# Patient Record
Sex: FEMALE | Race: BLACK OR AFRICAN AMERICAN | NOT HISPANIC OR LATINO | Employment: FULL TIME | URBAN - METROPOLITAN AREA
[De-identification: names, ages, dates, MRNs, and addresses within clinical notes are randomized per-mention and may not be internally consistent; named-entity substitution may affect disease eponyms.]

---

## 2022-11-28 ENCOUNTER — HOSPITAL ENCOUNTER (OUTPATIENT)
Dept: DATA CONVERSION | Facility: HOSPITAL | Age: 27
End: 2022-11-28
Attending: OBSTETRICS & GYNECOLOGY

## 2022-11-28 DIAGNOSIS — Z3A.36 36 WEEKS GESTATION OF PREGNANCY (HHS-HCC): ICD-10-CM

## 2022-11-28 DIAGNOSIS — J45.909 UNSPECIFIED ASTHMA, UNCOMPLICATED (HHS-HCC): ICD-10-CM

## 2022-11-28 DIAGNOSIS — O99.513 DISEASES OF THE RESPIRATORY SYSTEM COMPLICATING PREGNANCY, THIRD TRIMESTER (HHS-HCC): ICD-10-CM

## 2022-11-28 DIAGNOSIS — E28.2 POLYCYSTIC OVARIAN SYNDROME: ICD-10-CM

## 2022-11-28 DIAGNOSIS — O99.283 ENDOCRINE, NUTRITIONAL AND METABOLIC DISEASES COMPLICATING PREGNANCY, THIRD TRIMESTER (HHS-HCC): ICD-10-CM

## 2022-11-28 DIAGNOSIS — O47.03 FALSE LABOR BEFORE 37 COMPLETED WEEKS OF GESTATION, THIRD TRIMESTER (HHS-HCC): ICD-10-CM

## 2022-11-28 DIAGNOSIS — Z34.80 ENCOUNTER FOR SUPERVISION OF OTHER NORMAL PREGNANCY, UNSPECIFIED TRIMESTER (HHS-HCC): ICD-10-CM

## 2022-11-28 DIAGNOSIS — N89.8 OTHER SPECIFIED NONINFLAMMATORY DISORDERS OF VAGINA: ICD-10-CM

## 2022-11-28 DIAGNOSIS — D57.3 SICKLE-CELL TRAIT (CMS-HCC): ICD-10-CM

## 2022-11-28 DIAGNOSIS — D64.9 ANEMIA, UNSPECIFIED: ICD-10-CM

## 2022-11-28 DIAGNOSIS — O99.891 OTHER SPECIFIED DISEASES AND CONDITIONS COMPLICATING PREGNANCY (HHS-HCC): ICD-10-CM

## 2022-11-28 DIAGNOSIS — O99.013 ANEMIA COMPLICATING PREGNANCY, THIRD TRIMESTER (HHS-HCC): ICD-10-CM

## 2022-11-28 LAB — POCT GLUCOSE: 99 MG/DL (ref 74–99)

## 2022-11-29 LAB
CLUE CELLS: ABNORMAL
NUGENT SCORE: 0
YEAST: PRESENT

## 2023-03-01 NOTE — PROGRESS NOTES
Current Stage:   Stage: Triage     OB Dating:   EDC/EGA:  ·  Final BRISSA 23-Dec-2022   ·  EGA 36.3     Subjective Data:   Antepartum:  Vaginal Bleeding: No   Contractions/Abdominal Pain: No   Discharge/Loss of Fluid: Yes   Fetal Movement: Good   Fevers/Chills: No   Preeclampsia Symptoms: No   Antepartum:    Rocio is a 26yo  at 36.3 b/o 13.4wk U/S who presents to triage for LOF.  She states her paints were wet at 2pm today.  She denies continued leaking.  She states the fluid was  white.  She denies VB, ctx, and endorses good fetal movement.    Pregnancy notables:  - Abnormal 1hr, declining 3hr, checking BS at home  - Anemia--> Hgb 10.1 , PO iron --> 9.6 in triage on   - Asthma, remote hx in childhood, no inhaler use, no h/o hospitalization  - Sickle cell trait- urine culture each trimester    ObHx:  : 39.3 wga,  (7#3oz)  2015: 39.3 wga,  (7#6oz)  GynHx: denies   PMH: PCOS  PSH: : breast reduction  FamHx: breast,  colon, liver  CA- paternal grandparents  Meds: PNV, Iron daily  All: NKDA  Social: denies t/e/d      Objective Information:    Objective Information:      T   P  R  BP   MAP  SpO2   Value  36.8  107  16  121/63   85  97%  Date/Time  15:53  16:37  15:53  15:53   15:53  16:37  Range  (36.8C - 37C )  (93 - 120 )  (16 - 16 )  (115 - 121 )/ (63 - 70 )  (85 - 85 )  (97% - 100% )  Highest temp of 37 C was recorded at  15:21      Pain reported at  15:53: pt only reports labor-like pain and denies other pain at this time      Physical Exam:   Constitutional: Alert, conversational, well-appearing   Obstetric: Cervical Exam: /-3    FHT: 145, mod variability, +accels, -decels   [place]: irritability    SSE:  - Neg for nitrazine, ferning, and pooling  - Thick discharge noted   Eyes: Sclera white, EOM intact, no discharge or erythema   ENMT: No hearing deficit, no goiter present   Head/Neck: Normocephalic, atraumatic, full range  of motion intact    Respiratory/Thorax: No increased work of breathing,  no cough present, rate normal, clear to auscultation   Cardiovascular: No edema present, RRR   Gastrointestinal: Gravid   Genitourinary: No suprapubic tenderness   Musculoskeletal: Strength +5 in all extremities bilaterally   Extremities: No calf tenderness, redness or warmth   Neurological: A/O x3, conversational   Breast: No redness or warmth   Psychological: Behavior appropriate, interactive   Skin: No rashes or lesions      Testing:   NST Interpretation - Baby A:  ·  Baseline    ·  Variability moderate (amplitude range 6 to 25 bpm)   ·  Interpretation Reactive (2 15x15 accels)   ·  Accelerations Present   ·  Decelerations Absent     Biophysical Profile - Baby A:  ·  Fetal Reactive Reactive non stress test     Assessment and Plan:        Additional Dx:   False labor: Entered Date: 2022 16:49   Non-stress test reactive: Entered Date: 2022 16:48   36 weeks gestation of pregnancy: Entered Date: 2022  16:48    Assessment:    Rocio is a 28yo  at 36.3 b/o 13.4wk U/S who presents to triage for LOF    False labor   - Cervix: /-3  - TOCO: quiet  - SSE: neg x3  - S/sx of labor reviewed  - Recommended tylenol, warm showers, hot packs for discomfort    IUP at 36.3wks  - NST reactive  - Good fetal movement  - Continue routine prenatal care  - Next appt   - Precautions to return discussed     Maternal Well-being  - Vital signs stable and WNL  - Emotional support and reassurance provided  - All questions and concerns addressed    Plan and tracing discussed with Dr. Spencer who reviewed tracing and agrees with d/c home.    Cielo Anderson, MSN, APRN, FNP-C      Attestation:   Note Completion:  I am a:  Advanced Practice Provider   Attending Only - Shared Visit with Advanced Practice Provider This is a shared visit.  I have reviewed the Advanced Practice Provider?s encounter note, approve the Advanced Practice Provider?s  documentation,  and provide the following additional information from my personal encounter.    Comments/ Additional Findings    Patient seen. Agree with assessment and plan above.     Sabi Spencer MD  OB/GYN Attending Physician           Electronic Signatures:  Sabi Spencer)  (Signed 2022 17:04)   Authored: Note Completion   Co-Signer: Current Stage, OB Dating, Subjective Data, Objective Data,  Testing, Assessment and Plan, Note Completion  Cielo Anderson (APRN-CNP)  (Signed 2022 16:52)   Authored: Current Stage, OB Dating, Subjective Data,  Objective Data,  Testing, Assessment and Plan, Note Completion      Last Updated: 2022 17:04 by Sabi Spencer)

## 2023-03-29 LAB
HISTOPLASMA AG INTERP.NON-BLD.: NEGATIVE
HISTOPLASMA ANTIGEN ,NON BLOOD: NORMAL

## 2023-04-03 LAB
HISTOPLASMA AB, ID: NOT DETECTED
HISTOPLASMA MYCELIA AB, CF: ABNORMAL
HISTOPLASMA YEAST ANTIBODIES BY CF: ABNORMAL

## 2023-09-06 VITALS
RESPIRATION RATE: 16 BRPM | TEMPERATURE: 98.6 F | SYSTOLIC BLOOD PRESSURE: 115 MMHG | DIASTOLIC BLOOD PRESSURE: 70 MMHG | HEIGHT: 63 IN | HEART RATE: 93 BPM | BODY MASS INDEX: 40.23 KG/M2 | OXYGEN SATURATION: 97 % | WEIGHT: 227.07 LBS

## 2023-09-14 NOTE — PROGRESS NOTES
Current Stage:   Stage: Triage     OB Dating:   EDC/EGA:  ·  Final BRISSA 23-Dec-2022   ·  EGA 36.3     Subjective Data:   Antepartum:  Vaginal Bleeding: No   Contractions/Abdominal Pain: No   Discharge/Loss of Fluid: Yes   Fetal Movement: Good   Fevers/Chills: No   Preeclampsia Symptoms: No   Antepartum:    Rocio is a 26yo  at 36.3 b/o 13.4wk U/S who presents to triage for LOF.  She states her paints were wet at 2pm today.  She denies continued leaking.  She states the fluid was  white.  She denies VB, ctx, and endorses good fetal movement.    Pregnancy notables:  - Abnormal 1hr, declining 3hr, checking BS at home  - Anemia--> Hgb 10.1 , PO iron --> 9.6 in triage on   - Asthma, remote hx in childhood, no inhaler use, no h/o hospitalization  - Sickle cell trait- urine culture each trimester    ObHx:  : 39.3 wga,  (7#3oz)  2015: 39.3 wga,  (7#6oz)  GynHx: denies   PMH: PCOS  PSH: : breast reduction  FamHx: breast,  colon, liver  CA- paternal grandparents  Meds: PNV, Iron daily  All: NKDA  Social: denies t/e/d      Objective Information:    Objective Information:      T   P  R  BP   MAP  SpO2   Value  36.8  107  16  121/63   85  97%  Date/Time  15:53  16:37  15:53  15:53   15:53  16:37  Range  (36.8C - 37C )  (93 - 120 )  (16 - 16 )  (115 - 121 )/ (63 - 70 )  (85 - 85 )  (97% - 100% )  Highest temp of 37 C was recorded at  15:21      Pain reported at  15:53: pt only reports labor-like pain and denies other pain at this time      Physical Exam:   Constitutional: Alert, conversational, well-appearing   Obstetric: Cervical Exam: /-3    FHT: 145, mod variability, +accels, -decels   [place]: irritability    SSE:  - Neg for nitrazine, ferning, and pooling  - Thick discharge noted   Eyes: Sclera white, EOM intact, no discharge or erythema   ENMT: No hearing deficit, no goiter present   Head/Neck: Normocephalic, atraumatic, full range  of motion intact    Respiratory/Thorax: No increased work of breathing,  no cough present, rate normal, clear to auscultation   Cardiovascular: No edema present, RRR   Gastrointestinal: Gravid   Genitourinary: No suprapubic tenderness   Musculoskeletal: Strength +5 in all extremities bilaterally   Extremities: No calf tenderness, redness or warmth   Neurological: A/O x3, conversational   Breast: No redness or warmth   Psychological: Behavior appropriate, interactive   Skin: No rashes or lesions      Testing:   NST Interpretation - Baby A:  ·  Baseline    ·  Variability moderate (amplitude range 6 to 25 bpm)   ·  Interpretation Reactive (2 15x15 accels)   ·  Accelerations Present   ·  Decelerations Absent     Biophysical Profile - Baby A:  ·  Fetal Reactive Reactive non stress test     Assessment and Plan:        Additional Dx:   False labor: Entered Date: 2022 16:49   Non-stress test reactive: Entered Date: 2022 16:48   36 weeks gestation of pregnancy: Entered Date: 2022  16:48    Assessment:    Rocio is a 26yo  at 36.3 b/o 13.4wk U/S who presents to triage for LOF    False labor   - Cervix: /-3  - TOCO: quiet  - SSE: neg x3  - S/sx of labor reviewed  - Recommended tylenol, warm showers, hot packs for discomfort    IUP at 36.3wks  - NST reactive  - Good fetal movement  - Continue routine prenatal care  - Next appt   - Precautions to return discussed     Maternal Well-being  - Vital signs stable and WNL  - Emotional support and reassurance provided  - All questions and concerns addressed    Plan and tracing discussed with Dr. Spencer who reviewed tracing and agrees with d/c home.    Cielo Anderson, MSN, APRN, FNP-C      Attestation:   Note Completion:  I am a:  Advanced Practice Provider   Attending Only - Shared Visit with Advanced Practice Provider This is a shared visit.  I have reviewed the Advanced Practice Provider?s encounter note, approve the Advanced Practice Provider?s  documentation,  and provide the following additional information from my personal encounter.    Comments/ Additional Findings    Patient seen. Agree with assessment and plan above.     Sabi Spencer MD  OB/GYN Attending Physician           Electronic Signatures:  Sabi Spencer)  (Signed 2022 17:04)   Authored: Note Completion   Co-Signer: Current Stage, OB Dating, Subjective Data, Objective Data,  Testing, Assessment and Plan, Note Completion  Cielo Anderson (APRN-CNP)  (Signed 2022 16:52)   Authored: Current Stage, OB Dating, Subjective Data,  Objective Data,  Testing, Assessment and Plan, Note Completion      Last Updated: 2022 17:04 by Sabi Spencer)

## 2023-11-05 PROBLEM — M67.432 GANGLION, LEFT WRIST: Status: ACTIVE | Noted: 2018-08-30

## 2023-11-05 PROBLEM — R73.09 ABNORMAL BLOOD SUGAR: Status: ACTIVE | Noted: 2023-11-05

## 2023-11-05 PROBLEM — N89.8 VAGINAL DISCHARGE: Status: ACTIVE | Noted: 2023-11-05

## 2023-11-05 PROBLEM — B96.89 BV (BACTERIAL VAGINOSIS): Status: ACTIVE | Noted: 2023-11-05

## 2023-11-05 PROBLEM — B37.9 CANDIDIASIS: Status: ACTIVE | Noted: 2023-11-05

## 2023-11-05 PROBLEM — N76.0 BV (BACTERIAL VAGINOSIS): Status: ACTIVE | Noted: 2023-11-05

## 2023-11-05 PROBLEM — D57.3 SICKLE CELL TRAIT (CMS-HCC): Status: ACTIVE | Noted: 2023-11-05

## 2023-11-05 PROBLEM — O26.849: Status: ACTIVE | Noted: 2023-11-05

## 2023-11-05 PROBLEM — O99.019 ANEMIA AFFECTING PREGNANCY (HHS-HCC): Status: ACTIVE | Noted: 2023-11-05

## 2023-11-05 PROBLEM — N91.1 SECONDARY AMENORRHEA: Status: ACTIVE | Noted: 2023-11-05

## 2023-11-05 PROBLEM — R63.2 POLYPHAGIA: Status: ACTIVE | Noted: 2023-11-05

## 2023-11-05 PROBLEM — L70.0 ACNE VULGARIS: Status: ACTIVE | Noted: 2018-08-30

## 2023-11-05 PROBLEM — E66.9 OBESITY (BMI 30-39.9): Status: ACTIVE | Noted: 2023-11-05

## 2023-11-05 PROBLEM — F52.9 SEXUAL DYSFUNCTION IN FEMALES: Status: ACTIVE | Noted: 2023-11-05

## 2023-11-05 PROBLEM — R31.9 HEMATURIA: Status: ACTIVE | Noted: 2023-11-05

## 2023-11-05 PROBLEM — E28.2 PCOS (POLYCYSTIC OVARIAN SYNDROME): Status: ACTIVE | Noted: 2023-11-05

## 2023-11-05 PROBLEM — R30.0 DYSURIA: Status: ACTIVE | Noted: 2023-11-05

## 2023-11-05 RX ORDER — BLOOD SUGAR DIAGNOSTIC
STRIP MISCELLANEOUS
COMMUNITY
Start: 2022-10-03 | End: 2023-11-29 | Stop reason: ALTCHOICE

## 2023-11-05 RX ORDER — PNV NO.95/FERROUS FUM/FOLIC AC 28MG-0.8MG
1 TABLET ORAL DAILY
COMMUNITY
Start: 2022-09-28 | End: 2023-11-29 | Stop reason: ALTCHOICE

## 2023-11-07 ENCOUNTER — OFFICE VISIT (OUTPATIENT)
Dept: OBSTETRICS AND GYNECOLOGY | Facility: CLINIC | Age: 28
End: 2023-11-07
Payer: COMMERCIAL

## 2023-11-07 VITALS
BODY MASS INDEX: 38.37 KG/M2 | WEIGHT: 216 LBS | HEART RATE: 92 BPM | DIASTOLIC BLOOD PRESSURE: 77 MMHG | SYSTOLIC BLOOD PRESSURE: 109 MMHG

## 2023-11-07 DIAGNOSIS — E66.9 CLASS II OBESITY: ICD-10-CM

## 2023-11-07 DIAGNOSIS — Z11.3 SCREENING FOR STDS (SEXUALLY TRANSMITTED DISEASES): ICD-10-CM

## 2023-11-07 DIAGNOSIS — F41.0 PANIC ANXIETY SYNDROME: ICD-10-CM

## 2023-11-07 DIAGNOSIS — M79.661 RIGHT CALF PAIN: ICD-10-CM

## 2023-11-07 DIAGNOSIS — Z12.4 CERVICAL CANCER SCREENING: ICD-10-CM

## 2023-11-07 DIAGNOSIS — Z01.419 VISIT FOR GYNECOLOGIC EXAMINATION: Primary | ICD-10-CM

## 2023-11-07 PROCEDURE — 88175 CYTOPATH C/V AUTO FLUID REDO: CPT | Mod: TC | Performed by: ADVANCED PRACTICE MIDWIFE

## 2023-11-07 PROCEDURE — 87800 DETECT AGNT MULT DNA DIREC: CPT | Performed by: ADVANCED PRACTICE MIDWIFE

## 2023-11-07 PROCEDURE — 99395 PREV VISIT EST AGE 18-39: CPT | Performed by: ADVANCED PRACTICE MIDWIFE

## 2023-11-07 PROCEDURE — 87661 TRICHOMONAS VAGINALIS AMPLIF: CPT | Mod: 59 | Performed by: ADVANCED PRACTICE MIDWIFE

## 2023-11-07 ASSESSMENT — ENCOUNTER SYMPTOMS
GASTROINTESTINAL NEGATIVE: 0
PSYCHIATRIC NEGATIVE: 1
EYES NEGATIVE: 1
GASTROINTESTINAL NEGATIVE: 1
HEMATOLOGIC/LYMPHATIC NEGATIVE: 1
MUSCULOSKELETAL NEGATIVE: 0
HEMATOLOGIC/LYMPHATIC NEGATIVE: 0
CONSTITUTIONAL NEGATIVE: 0
ENDOCRINE NEGATIVE: 1
CARDIOVASCULAR NEGATIVE: 0
CARDIOVASCULAR NEGATIVE: 1
ENDOCRINE NEGATIVE: 0
MUSCULOSKELETAL NEGATIVE: 1
NEUROLOGICAL NEGATIVE: 0
RESPIRATORY NEGATIVE: 0
RESPIRATORY NEGATIVE: 1
NEUROLOGICAL NEGATIVE: 1
CONSTITUTIONAL NEGATIVE: 1
ALLERGIC/IMMUNOLOGIC NEGATIVE: 0
PSYCHIATRIC NEGATIVE: 0
ALLERGIC/IMMUNOLOGIC NEGATIVE: 1
EYES NEGATIVE: 0

## 2023-11-07 ASSESSMENT — PAIN SCALES - GENERAL: PAINLEVEL: 0-NO PAIN

## 2023-11-07 NOTE — PROGRESS NOTES
Subjective   Rocio Jones is a 28 y.o. female who is here for a routine exam. Periods are regular every 28-30 days, lasting  3-5 days. Dysmenorrhea:mild, occurring first 1-2 days of flow. Cyclic symptoms include none. No intermenstrual bleeding, spotting, or discharge.    Sexually active with partner of 6 yrs desires BTL signed consent today     Current contraception: none  History of abnormal Pap smear: no  Family history of uterine or ovarian cancer: no  Regular self breast exam: yes  History of abnormal mammogram: no  Family history of breast cancer: yes - both grandmothers Maternal diagnosis at age 50 and paternal at age 40  History of abnormal lipids: no  Menstrual History:  OB History    No obstetric history on file.        Patient's last menstrual period was 10/27/2023.         Review of Systems   Constitutional: Negative.    HENT: Negative.     Eyes: Negative.    Respiratory: Negative.     Cardiovascular: Negative.    Gastrointestinal: Negative.    Endocrine: Negative.    Genitourinary:  Positive for vaginal discharge. Negative for pelvic pain.   Musculoskeletal: Negative.    Skin: Negative.    Allergic/Immunologic: Negative.    Neurological: Negative.    Hematological: Negative.    Psychiatric/Behavioral: Negative.         Objective   /77   Pulse 92   Wt 98 kg (216 lb)   LMP 10/27/2023   BMI 38.37 kg/m²     Physical Exam  Vitals reviewed.   Constitutional:       General: She is not in acute distress.     Appearance: Normal appearance.   HENT:      Head: Normocephalic.   Eyes:      Pupils: Pupils are equal, round, and reactive to light.   Cardiovascular:      Rate and Rhythm: Normal rate and regular rhythm.      Heart sounds: No murmur heard.     No gallop.   Pulmonary:      Effort: Pulmonary effort is normal. No respiratory distress.      Breath sounds: Normal breath sounds. No stridor. No wheezing, rhonchi or rales.   Chest:      Chest wall: No tenderness or crepitus.   Breasts:     Right: No  inverted nipple, mass, nipple discharge, skin change or tenderness.      Left: Normal. No inverted nipple, mass, nipple discharge, skin change or tenderness.   Abdominal:      General: Abdomen is flat.      Palpations: Abdomen is soft. There is no mass.      Tenderness: There is no abdominal tenderness. There is no right CVA tenderness, left CVA tenderness or rebound.      Hernia: No hernia is present.   Genitourinary:     General: Normal vulva.      Pubic Area: No rash.       Labia:         Right: No rash, tenderness, lesion or injury.         Left: No rash, tenderness, lesion or injury.       Urethra: No prolapse or urethral swelling.      Vagina: Normal. No foreign body. No erythema, tenderness, bleeding, lesions or prolapsed vaginal walls.      Cervix: No cervical motion tenderness, friability or lesion.      Uterus: Normal. Not enlarged, not tender and no uterine prolapse.       Adnexa: Right adnexa normal and left adnexa normal.        Right: No mass or tenderness.          Left: No mass or tenderness.        Rectum: Normal.      Comments: EGBUS WNL   Musculoskeletal:      Cervical back: Neck supple. No rigidity or tenderness.   Skin:     General: Skin is warm and dry.   Neurological:      Mental Status: She is alert.   Psychiatric:         Mood and Affect: Mood normal.         Behavior: Behavior normal.         Thought Content: Thought content normal.         Judgment: Judgment normal.        Assessment/Plan   Problem List Items Addressed This Visit    None  Visit Diagnoses       Visit for gynecologic examination    -  Primary    Relevant Orders    CBC    Hemoglobin A1c    Cervical cancer screening        Relevant Orders    THINPREP PAP TEST    Screening for STDs (sexually transmitted diseases)        Relevant Orders    Hepatitis C Antibody    HIV-1 and HIV-2 antibodies    Syphilis Screen with Reflex    TSH with reflex to Free T4 if abnormal    Hepatitis B surface Ag    Panic anxiety syndrome        Relevant  Orders    Referral to [facility]    Referral to Psychology    Referral to Primary Care    Right calf pain        Relevant Orders    Referral to [facility]    Referral to Primary Care    Class II obesity        Relevant Orders    Referral to Nutrition Services    Referral to [facility]             All questions answered.  Await pap smear results.  Blood tests: CBC with diff, TSH, and STI panel.  Referral to [facility] and psychology  .  Return to clinic for Tubal consult consent signed today    Return to care in 1yr or PRN  Deanna PATEL CNM

## 2023-11-09 LAB
C TRACH RRNA SPEC QL NAA+PROBE: NEGATIVE
N GONORRHOEA DNA SPEC QL PROBE+SIG AMP: NEGATIVE
T VAGINALIS RRNA SPEC QL NAA+PROBE: NEGATIVE

## 2023-11-28 LAB
CYTOLOGY CMNT CVX/VAG CYTO-IMP: NORMAL
LAB AP HPV GENOTYPE QUESTION: YES
LAB AP HPV HR: NORMAL
LAB AP PAP ADDITIONAL TESTS: NORMAL
LABORATORY COMMENT REPORT: NORMAL
LMP START DATE: NORMAL
PATH REPORT.TOTAL CANCER: NORMAL

## 2023-11-29 ENCOUNTER — PREP FOR PROCEDURE (OUTPATIENT)
Dept: OBSTETRICS AND GYNECOLOGY | Facility: CLINIC | Age: 28
End: 2023-11-29
Payer: COMMERCIAL

## 2023-11-29 ENCOUNTER — OFFICE VISIT (OUTPATIENT)
Dept: OBSTETRICS AND GYNECOLOGY | Facility: CLINIC | Age: 28
End: 2023-11-29
Payer: COMMERCIAL

## 2023-11-29 VITALS
DIASTOLIC BLOOD PRESSURE: 84 MMHG | HEART RATE: 92 BPM | HEIGHT: 64 IN | WEIGHT: 214.4 LBS | SYSTOLIC BLOOD PRESSURE: 118 MMHG | BODY MASS INDEX: 36.6 KG/M2

## 2023-11-29 DIAGNOSIS — Z30.2 REQUEST FOR STERILIZATION: Primary | ICD-10-CM

## 2023-11-29 PROCEDURE — 1036F TOBACCO NON-USER: CPT | Performed by: OBSTETRICS & GYNECOLOGY

## 2023-11-29 PROCEDURE — 99214 OFFICE O/P EST MOD 30 MIN: CPT | Performed by: OBSTETRICS & GYNECOLOGY

## 2023-11-29 RX ORDER — CELECOXIB 50 MG/1
400 CAPSULE ORAL ONCE
Status: CANCELLED | OUTPATIENT
Start: 2023-11-29 | End: 2023-11-29

## 2023-11-29 RX ORDER — ACETAMINOPHEN 325 MG/1
975 TABLET ORAL ONCE
Status: CANCELLED | OUTPATIENT
Start: 2023-11-29 | End: 2023-11-29

## 2023-11-29 RX ORDER — GABAPENTIN 600 MG/1
600 TABLET ORAL ONCE
Status: CANCELLED | OUTPATIENT
Start: 2023-11-29 | End: 2023-11-29

## 2023-11-29 ASSESSMENT — PAIN SCALES - GENERAL: PAINLEVEL: 0-NO PAIN

## 2023-11-29 NOTE — ASSESSMENT & PLAN NOTE
-Discussed r/b/a of bilateral salpingectomy for permanent tubal sterilization. Discussed laparoscopic approach. Discussed options for partial vs complete salpingectomy. Discussed risks including infection, bleeding, damage to surrounding structures, risk of regret. All patient questions answered. Will plan for laparoscopic approach.   -Signed federal consent form.  -Will plan for surgery at [facility] or [facility], pt appropriate for either. Prep for procedure completed, office will contact pt to schedule in January.  -Pt declines contraception in meantime to bridge to surgery.

## 2023-11-29 NOTE — PROGRESS NOTES
Subjective   Patient ID: Rocio Jones is a 28 y.o. female who presents for tubal consult.  HPI    29yo  p/f evaluation for tubal sterilization. Had previously signed consents for it after delivery of her youngest child about 1 yr ago but never scheduled after that. Desires permanent sterilization. Tried nexplanon, IUD, OCPs in past and did not like them.    GYN hx: Menses [id number], last 4-5 days, moderate bleeding (changes tampon q3 hrs, does not bleed through it), minimal cramping. Sexually active w/ one male partner. Denies hx of abnl paps. Last was .  OB hx: 3x FT VD. GDM in last pregnancy. Denies other complications.  PMH: Denies  PSH: breast reduction . Denies abdominal surgeries.  Meds: none    Review of Systems   All other systems reviewed and are negative.      Objective   Physical Exam  Vitals reviewed.   Constitutional:       Appearance: Normal appearance.   Pulmonary:      Effort: Pulmonary effort is normal.   Abdominal:      General: Abdomen is flat.      Palpations: Abdomen is soft.      Tenderness: There is no abdominal tenderness.      Comments: No abdominal incisions   Musculoskeletal:         General: Normal range of motion.   Neurological:      Mental Status: She is alert and oriented to person, place, and time.   Psychiatric:         Mood and Affect: Mood normal.         Behavior: Behavior normal.         Assessment/Plan   Problem List Items Addressed This Visit             ICD-10-CM    Request for sterilization - Primary Z30.2     -Discussed r/b/a of bilateral salpingectomy for permanent tubal sterilization. Discussed laparoscopic approach. Discussed options for partial vs complete salpingectomy. Discussed risks including infection, bleeding, damage to surrounding structures, risk of regret. All patient questions answered. Will plan for laparoscopic approach.   -Signed federal consent form.  -Will plan for surgery at [facility] or [facility], pt appropriate for either. Prep for  procedure completed, office will contact pt to schedule in January.  -Pt declines contraception in meantime to bridge to surgery.             Pt seen and d/w Dr. Colin Byers MD PGY-2

## 2023-12-06 ENCOUNTER — APPOINTMENT (OUTPATIENT)
Dept: PRIMARY CARE | Facility: CLINIC | Age: 28
End: 2023-12-06
Payer: COMMERCIAL

## 2024-01-03 ENCOUNTER — ANESTHESIA EVENT (OUTPATIENT)
Dept: OPERATING ROOM | Facility: HOSPITAL | Age: 29
End: 2024-01-03
Payer: COMMERCIAL

## 2024-01-03 NOTE — HOSPITAL COURSE
[X] PAT - not indicated   [ ] consent > federal signed , procedure to be signed   [X] same day discharge     29yo  p/f  bilateral tubal sterilization.     Labs:  (2023) Cr 0.83, hgb 13.7, plts 242     Had previously signed consents for it after delivery of her youngest child about 1 yr ago but never scheduled after that. Re-signed in office 23. Desires permanent sterilization. Tried nexplanon, IUD, OCPs in past and did not like them.     GYN hx: Menses [id number], last 4-5 days, moderate bleeding (changes tampon q3 hrs, does not bleed through it), minimal cramping. Sexually active w/ one male partner. Denies hx of abnl paps. Last was .  OB hx: 3x FT VD. GDM in last pregnancy. Denies other complications.  PMH: Denies  PSH: breast reduction . Denies abdominal surgeries.  Meds: none

## 2024-01-04 ENCOUNTER — HOSPITAL ENCOUNTER (OUTPATIENT)
Facility: HOSPITAL | Age: 29
Setting detail: OUTPATIENT SURGERY
Discharge: HOME | End: 2024-01-04
Attending: OBSTETRICS & GYNECOLOGY | Admitting: OBSTETRICS & GYNECOLOGY
Payer: COMMERCIAL

## 2024-01-04 ENCOUNTER — ANESTHESIA (OUTPATIENT)
Dept: OPERATING ROOM | Facility: HOSPITAL | Age: 29
End: 2024-01-04
Payer: COMMERCIAL

## 2024-01-04 VITALS
TEMPERATURE: 96.8 F | HEART RATE: 95 BPM | OXYGEN SATURATION: 98 % | DIASTOLIC BLOOD PRESSURE: 79 MMHG | HEIGHT: 64 IN | WEIGHT: 215.17 LBS | RESPIRATION RATE: 14 BRPM | BODY MASS INDEX: 36.73 KG/M2 | SYSTOLIC BLOOD PRESSURE: 119 MMHG

## 2024-01-04 DIAGNOSIS — G89.18 POSTOPERATIVE PAIN: ICD-10-CM

## 2024-01-04 DIAGNOSIS — Z30.2 REQUEST FOR STERILIZATION: Primary | ICD-10-CM

## 2024-01-04 DIAGNOSIS — Z98.890 POSTOPERATIVE STATE: ICD-10-CM

## 2024-01-04 LAB — PREGNANCY TEST URINE, POC: NEGATIVE

## 2024-01-04 PROCEDURE — 2500000001 HC RX 250 WO HCPCS SELF ADMINISTERED DRUGS (ALT 637 FOR MEDICARE OP): Mod: SE | Performed by: OBSTETRICS & GYNECOLOGY

## 2024-01-04 PROCEDURE — 7100000002 HC RECOVERY ROOM TIME - EACH INCREMENTAL 1 MINUTE: Performed by: OBSTETRICS & GYNECOLOGY

## 2024-01-04 PROCEDURE — 3700000002 HC GENERAL ANESTHESIA TIME - EACH INCREMENTAL 1 MINUTE: Performed by: OBSTETRICS & GYNECOLOGY

## 2024-01-04 PROCEDURE — 88302 TISSUE EXAM BY PATHOLOGIST: CPT | Performed by: PATHOLOGY

## 2024-01-04 PROCEDURE — 2500000005 HC RX 250 GENERAL PHARMACY W/O HCPCS: Mod: SE | Performed by: OBSTETRICS & GYNECOLOGY

## 2024-01-04 PROCEDURE — 3700000001 HC GENERAL ANESTHESIA TIME - INITIAL BASE CHARGE: Performed by: OBSTETRICS & GYNECOLOGY

## 2024-01-04 PROCEDURE — 58661 LAPAROSCOPY REMOVE ADNEXA: CPT | Performed by: OBSTETRICS & GYNECOLOGY

## 2024-01-04 PROCEDURE — 2500000004 HC RX 250 GENERAL PHARMACY W/ HCPCS (ALT 636 FOR OP/ED): Mod: SE | Performed by: STUDENT IN AN ORGANIZED HEALTH CARE EDUCATION/TRAINING PROGRAM

## 2024-01-04 PROCEDURE — 88302 TISSUE EXAM BY PATHOLOGIST: CPT | Mod: TC,SUR | Performed by: OBSTETRICS & GYNECOLOGY

## 2024-01-04 PROCEDURE — A58661 PR LAP,RMV  ADNEXAL STRUCTURE: Performed by: STUDENT IN AN ORGANIZED HEALTH CARE EDUCATION/TRAINING PROGRAM

## 2024-01-04 PROCEDURE — 3600000003 HC OR TIME - INITIAL BASE CHARGE - PROCEDURE LEVEL THREE: Performed by: OBSTETRICS & GYNECOLOGY

## 2024-01-04 PROCEDURE — 2720000007 HC OR 272 NO HCPCS: Performed by: OBSTETRICS & GYNECOLOGY

## 2024-01-04 PROCEDURE — 7100000001 HC RECOVERY ROOM TIME - INITIAL BASE CHARGE: Performed by: OBSTETRICS & GYNECOLOGY

## 2024-01-04 PROCEDURE — 3600000008 HC OR TIME - EACH INCREMENTAL 1 MINUTE - PROCEDURE LEVEL THREE: Performed by: OBSTETRICS & GYNECOLOGY

## 2024-01-04 PROCEDURE — 7100000009 HC PHASE TWO TIME - INITIAL BASE CHARGE: Performed by: OBSTETRICS & GYNECOLOGY

## 2024-01-04 PROCEDURE — 2500000004 HC RX 250 GENERAL PHARMACY W/ HCPCS (ALT 636 FOR OP/ED): Mod: SE

## 2024-01-04 PROCEDURE — A58661 PR LAP,RMV  ADNEXAL STRUCTURE

## 2024-01-04 PROCEDURE — 94760 N-INVAS EAR/PLS OXIMETRY 1: CPT

## 2024-01-04 PROCEDURE — 2500000001 HC RX 250 WO HCPCS SELF ADMINISTERED DRUGS (ALT 637 FOR MEDICARE OP): Mod: SE

## 2024-01-04 PROCEDURE — 2500000005 HC RX 250 GENERAL PHARMACY W/O HCPCS: Mod: SE

## 2024-01-04 PROCEDURE — 81025 URINE PREGNANCY TEST: CPT | Performed by: STUDENT IN AN ORGANIZED HEALTH CARE EDUCATION/TRAINING PROGRAM

## 2024-01-04 PROCEDURE — A4217 STERILE WATER/SALINE, 500 ML: HCPCS | Mod: SE | Performed by: OBSTETRICS & GYNECOLOGY

## 2024-01-04 PROCEDURE — 2500000001 HC RX 250 WO HCPCS SELF ADMINISTERED DRUGS (ALT 637 FOR MEDICARE OP): Mod: SE | Performed by: STUDENT IN AN ORGANIZED HEALTH CARE EDUCATION/TRAINING PROGRAM

## 2024-01-04 PROCEDURE — 2500000004 HC RX 250 GENERAL PHARMACY W/ HCPCS (ALT 636 FOR OP/ED): Mod: SE | Performed by: OBSTETRICS & GYNECOLOGY

## 2024-01-04 PROCEDURE — 7100000010 HC PHASE TWO TIME - EACH INCREMENTAL 1 MINUTE: Performed by: OBSTETRICS & GYNECOLOGY

## 2024-01-04 RX ORDER — OXYCODONE HYDROCHLORIDE 5 MG/1
10 TABLET ORAL EVERY 4 HOURS PRN
Status: DISCONTINUED | OUTPATIENT
Start: 2024-01-04 | End: 2024-01-04 | Stop reason: HOSPADM

## 2024-01-04 RX ORDER — ROCURONIUM BROMIDE 10 MG/ML
INJECTION, SOLUTION INTRAVENOUS AS NEEDED
Status: DISCONTINUED | OUTPATIENT
Start: 2024-01-04 | End: 2024-01-04

## 2024-01-04 RX ORDER — DEXAMETHASONE SODIUM PHOSPHATE 4 MG/ML
INJECTION, SOLUTION INTRA-ARTICULAR; INTRALESIONAL; INTRAMUSCULAR; INTRAVENOUS; SOFT TISSUE AS NEEDED
Status: DISCONTINUED | OUTPATIENT
Start: 2024-01-04 | End: 2024-01-04

## 2024-01-04 RX ORDER — SODIUM CHLORIDE, SODIUM LACTATE, POTASSIUM CHLORIDE, CALCIUM CHLORIDE 600; 310; 30; 20 MG/100ML; MG/100ML; MG/100ML; MG/100ML
100 INJECTION, SOLUTION INTRAVENOUS CONTINUOUS
Status: DISCONTINUED | OUTPATIENT
Start: 2024-01-04 | End: 2024-01-04 | Stop reason: HOSPADM

## 2024-01-04 RX ORDER — METHOCARBAMOL 100 MG/ML
1000 INJECTION, SOLUTION INTRAMUSCULAR; INTRAVENOUS ONCE
Status: COMPLETED | OUTPATIENT
Start: 2024-01-04 | End: 2024-01-04

## 2024-01-04 RX ORDER — GABAPENTIN 600 MG/1
600 TABLET ORAL ONCE
Status: COMPLETED | OUTPATIENT
Start: 2024-01-04 | End: 2024-01-04

## 2024-01-04 RX ORDER — OXYCODONE HYDROCHLORIDE 5 MG/1
5 TABLET ORAL EVERY 6 HOURS PRN
Qty: 8 TABLET | Refills: 0 | Status: SHIPPED | OUTPATIENT
Start: 2024-01-04 | End: 2024-01-26

## 2024-01-04 RX ORDER — PROPOFOL 10 MG/ML
INJECTION, EMULSION INTRAVENOUS AS NEEDED
Status: DISCONTINUED | OUTPATIENT
Start: 2024-01-04 | End: 2024-01-04

## 2024-01-04 RX ORDER — IBUPROFEN 600 MG/1
600 TABLET ORAL EVERY 6 HOURS PRN
Qty: 60 TABLET | Refills: 3 | Status: SHIPPED | OUTPATIENT
Start: 2024-01-04

## 2024-01-04 RX ORDER — HYDROMORPHONE HYDROCHLORIDE 1 MG/ML
0.4 INJECTION, SOLUTION INTRAMUSCULAR; INTRAVENOUS; SUBCUTANEOUS EVERY 5 MIN PRN
Status: DISCONTINUED | OUTPATIENT
Start: 2024-01-04 | End: 2024-01-04 | Stop reason: HOSPADM

## 2024-01-04 RX ORDER — PHENYLEPHRINE HCL IN 0.9% NACL 1 MG/10 ML
SYRINGE (ML) INTRAVENOUS AS NEEDED
Status: DISCONTINUED | OUTPATIENT
Start: 2024-01-04 | End: 2024-01-04

## 2024-01-04 RX ORDER — MIDAZOLAM HYDROCHLORIDE 1 MG/ML
INJECTION INTRAMUSCULAR; INTRAVENOUS AS NEEDED
Status: DISCONTINUED | OUTPATIENT
Start: 2024-01-04 | End: 2024-01-04

## 2024-01-04 RX ORDER — BUPIVACAINE HYDROCHLORIDE 5 MG/ML
INJECTION, SOLUTION PERINEURAL AS NEEDED
Status: DISCONTINUED | OUTPATIENT
Start: 2024-01-04 | End: 2024-01-04 | Stop reason: HOSPADM

## 2024-01-04 RX ORDER — ACETAMINOPHEN 325 MG/1
975 TABLET ORAL ONCE
Status: COMPLETED | OUTPATIENT
Start: 2024-01-04 | End: 2024-01-04

## 2024-01-04 RX ORDER — AMOXICILLIN 250 MG
1 CAPSULE ORAL 2 TIMES DAILY
Qty: 60 TABLET | Refills: 2 | Status: SHIPPED | OUTPATIENT
Start: 2024-01-04 | End: 2024-01-26

## 2024-01-04 RX ORDER — LIDOCAINE HCL/PF 100 MG/5ML
SYRINGE (ML) INTRAVENOUS AS NEEDED
Status: DISCONTINUED | OUTPATIENT
Start: 2024-01-04 | End: 2024-01-04

## 2024-01-04 RX ORDER — LIDOCAINE IN NACL,ISO-OSMOT/PF 30 MG/3 ML
0.1 SYRINGE (ML) INJECTION ONCE
Status: DISCONTINUED | OUTPATIENT
Start: 2024-01-04 | End: 2024-01-04 | Stop reason: HOSPADM

## 2024-01-04 RX ORDER — ACETAMINOPHEN 325 MG/1
650 TABLET ORAL EVERY 4 HOURS PRN
Status: DISCONTINUED | OUTPATIENT
Start: 2024-01-04 | End: 2024-01-04 | Stop reason: HOSPADM

## 2024-01-04 RX ORDER — WATER 1 ML/ML
IRRIGANT IRRIGATION AS NEEDED
Status: DISCONTINUED | OUTPATIENT
Start: 2024-01-04 | End: 2024-01-04 | Stop reason: HOSPADM

## 2024-01-04 RX ORDER — CELECOXIB 200 MG/1
400 CAPSULE ORAL ONCE
Status: COMPLETED | OUTPATIENT
Start: 2024-01-04 | End: 2024-01-04

## 2024-01-04 RX ORDER — FENTANYL CITRATE 50 UG/ML
INJECTION, SOLUTION INTRAMUSCULAR; INTRAVENOUS AS NEEDED
Status: DISCONTINUED | OUTPATIENT
Start: 2024-01-04 | End: 2024-01-04

## 2024-01-04 RX ORDER — ESMOLOL HYDROCHLORIDE 10 MG/ML
INJECTION INTRAVENOUS AS NEEDED
Status: DISCONTINUED | OUTPATIENT
Start: 2024-01-04 | End: 2024-01-04

## 2024-01-04 RX ORDER — HYDROMORPHONE HYDROCHLORIDE 1 MG/ML
0.2 INJECTION, SOLUTION INTRAMUSCULAR; INTRAVENOUS; SUBCUTANEOUS EVERY 5 MIN PRN
Status: DISCONTINUED | OUTPATIENT
Start: 2024-01-04 | End: 2024-01-04 | Stop reason: HOSPADM

## 2024-01-04 RX ORDER — LABETALOL HYDROCHLORIDE 5 MG/ML
5 INJECTION, SOLUTION INTRAVENOUS ONCE AS NEEDED
Status: DISCONTINUED | OUTPATIENT
Start: 2024-01-04 | End: 2024-01-04 | Stop reason: HOSPADM

## 2024-01-04 RX ORDER — SODIUM CHLORIDE 0.9 G/100ML
IRRIGANT IRRIGATION AS NEEDED
Status: DISCONTINUED | OUTPATIENT
Start: 2024-01-04 | End: 2024-01-04 | Stop reason: HOSPADM

## 2024-01-04 RX ORDER — ONDANSETRON HYDROCHLORIDE 2 MG/ML
INJECTION, SOLUTION INTRAVENOUS AS NEEDED
Status: DISCONTINUED | OUTPATIENT
Start: 2024-01-04 | End: 2024-01-04

## 2024-01-04 RX ORDER — ONDANSETRON 4 MG/1
4 TABLET, ORALLY DISINTEGRATING ORAL EVERY 8 HOURS PRN
Qty: 10 TABLET | Refills: 0 | Status: SHIPPED | OUTPATIENT
Start: 2024-01-04 | End: 2024-01-26

## 2024-01-04 RX ORDER — CEFAZOLIN 1 G/1
INJECTION, POWDER, FOR SOLUTION INTRAVENOUS AS NEEDED
Status: DISCONTINUED | OUTPATIENT
Start: 2024-01-04 | End: 2024-01-04

## 2024-01-04 RX ORDER — ACETAMINOPHEN 500 MG
1000 TABLET ORAL EVERY 6 HOURS PRN
Qty: 90 TABLET | Refills: 3 | Status: SHIPPED | OUTPATIENT
Start: 2024-01-04 | End: 2024-01-26

## 2024-01-04 RX ADMIN — ROCURONIUM BROMIDE 10 MG: 10 INJECTION INTRAVENOUS at 08:56

## 2024-01-04 RX ADMIN — GABAPENTIN 600 MG: 600 TABLET, FILM COATED ORAL at 06:30

## 2024-01-04 RX ADMIN — CELECOXIB 400 MG: 200 CAPSULE ORAL at 06:30

## 2024-01-04 RX ADMIN — PROPOFOL 150 MG: 10 INJECTION, EMULSION INTRAVENOUS at 07:42

## 2024-01-04 RX ADMIN — ACETAMINOPHEN 975 MG: 325 TABLET ORAL at 06:29

## 2024-01-04 RX ADMIN — Medication 80 MCG: at 08:08

## 2024-01-04 RX ADMIN — ESMOLOL HYDROCHLORIDE 30 MG: 10 INJECTION, SOLUTION INTRAVENOUS at 07:55

## 2024-01-04 RX ADMIN — FENTANYL CITRATE 25 MCG: 50 INJECTION, SOLUTION INTRAMUSCULAR; INTRAVENOUS at 08:51

## 2024-01-04 RX ADMIN — OXYCODONE HYDROCHLORIDE 10 MG: 5 TABLET ORAL at 11:12

## 2024-01-04 RX ADMIN — ONDANSETRON 4 MG: 2 INJECTION, SOLUTION INTRAMUSCULAR; INTRAVENOUS at 09:01

## 2024-01-04 RX ADMIN — SODIUM CHLORIDE, SODIUM LACTATE, POTASSIUM CHLORIDE, AND CALCIUM CHLORIDE: 600; 310; 30; 20 INJECTION, SOLUTION INTRAVENOUS at 07:29

## 2024-01-04 RX ADMIN — MIDAZOLAM HYDROCHLORIDE 2 MG: 1 INJECTION, SOLUTION INTRAMUSCULAR; INTRAVENOUS at 07:29

## 2024-01-04 RX ADMIN — METHOCARBAMOL 1000 MG: 1000 INJECTION, SOLUTION INTRAMUSCULAR; INTRAVENOUS at 10:35

## 2024-01-04 RX ADMIN — CEFAZOLIN 2 G: 330 INJECTION, POWDER, FOR SOLUTION INTRAMUSCULAR; INTRAVENOUS at 08:00

## 2024-01-04 RX ADMIN — LIDOCAINE HYDROCHLORIDE 80 MG: 20 INJECTION INTRAVENOUS at 07:42

## 2024-01-04 RX ADMIN — SUGAMMADEX 200 MG: 100 INJECTION, SOLUTION INTRAVENOUS at 09:16

## 2024-01-04 RX ADMIN — FENTANYL CITRATE 50 MCG: 50 INJECTION, SOLUTION INTRAMUSCULAR; INTRAVENOUS at 07:42

## 2024-01-04 RX ADMIN — ROCURONIUM BROMIDE 50 MG: 10 INJECTION INTRAVENOUS at 07:44

## 2024-01-04 RX ADMIN — ROCURONIUM BROMIDE 10 MG: 10 INJECTION INTRAVENOUS at 08:15

## 2024-01-04 RX ADMIN — DEXAMETHASONE SODIUM PHOSPHATE 4 MG: 4 INJECTION, SOLUTION INTRA-ARTICULAR; INTRALESIONAL; INTRAMUSCULAR; INTRAVENOUS; SOFT TISSUE at 08:07

## 2024-01-04 RX ADMIN — FENTANYL CITRATE 25 MCG: 50 INJECTION, SOLUTION INTRAMUSCULAR; INTRAVENOUS at 09:27

## 2024-01-04 ASSESSMENT — PAIN SCALES - GENERAL
PAINLEVEL_OUTOF10: 4
PAINLEVEL_OUTOF10: 0 - NO PAIN
PAINLEVEL_OUTOF10: 7
PAINLEVEL_OUTOF10: 6

## 2024-01-04 ASSESSMENT — PAIN - FUNCTIONAL ASSESSMENT
PAIN_FUNCTIONAL_ASSESSMENT: 0-10

## 2024-01-04 ASSESSMENT — COLUMBIA-SUICIDE SEVERITY RATING SCALE - C-SSRS
2. HAVE YOU ACTUALLY HAD ANY THOUGHTS OF KILLING YOURSELF?: NO
6. HAVE YOU EVER DONE ANYTHING, STARTED TO DO ANYTHING, OR PREPARED TO DO ANYTHING TO END YOUR LIFE?: NO
1. IN THE PAST MONTH, HAVE YOU WISHED YOU WERE DEAD OR WISHED YOU COULD GO TO SLEEP AND NOT WAKE UP?: NO

## 2024-01-04 ASSESSMENT — PAIN DESCRIPTION - LOCATION: LOCATION: INCISION

## 2024-01-04 NOTE — ANESTHESIA PROCEDURE NOTES
Peripheral IV  Date/Time: 1/4/2024 7:47 AM      Placement  Needle size: 20 G  Laterality: right  Location: hand  Site prep: alcohol  Technique: anatomical landmarks

## 2024-01-04 NOTE — ANESTHESIA PROCEDURE NOTES
Airway  Date/Time: 1/4/2024 7:45 AM  Urgency: elective    Airway not difficult    Staffing  Performed: CHUN   Authorized by: Florin Kellogg DO    Performed by: JOSELUIS Rodriguez  Patient location during procedure: OR    Indications and Patient Condition  Indications for airway management: anesthesia and airway protection  Spontaneous ventilation: present  Sedation level: deep  Preoxygenated: yes  Patient position: sniffing  Mask difficulty assessment: 1 - vent by mask    Final Airway Details  Final airway type: endotracheal airway      Successful airway: ETT  Cuffed: yes   Successful intubation technique: direct laryngoscopy  Facilitating devices/methods: intubating stylet  Blade: Ganesh  Blade size: #3  ETT size (mm): 7.0  Cormack-Lehane Classification: grade I - full view of glottis  Placement verified by: chest auscultation, capnometry and palpation of cuff   Measured from: teeth  ETT to teeth (cm): 22  Number of attempts at approach: 1

## 2024-01-04 NOTE — H&P
Pre-op History and Physical    27yo  p/f  bilateral tubal sterilization.     Labs:  (2023) Cr 0.83, hgb 13.7, plts 242     Had previously signed consents for it after delivery of her youngest child about 1 yr ago but never scheduled after that. Re-signed in office 23. Desires permanent sterilization. Tried nexplanon, IUD, OCPs in past and did not like them.     GYN hx: Menses [id number], last 4-5 days, moderate bleeding (changes tampon q3 hrs, does not bleed through it), minimal cramping. Sexually active w/ one male partner. Denies hx of abnl paps. Last was .  OB hx: 3x FT VD. GDM in last pregnancy. Denies other complications.  PMH: Denies  PSH: breast reduction . Denies abdominal surgeries.  Meds: none    Past Medical History  History reviewed. No pertinent past medical history.     Past Surgical History   Past Surgical History:   Procedure Laterality Date    OTHER SURGICAL HISTORY  10/28/2021    Breast reduction       Social History  Social History     Tobacco Use    Smoking status: Never    Smokeless tobacco: Never   Substance Use Topics    Alcohol use: Yes     Alcohol/week: 4.0 standard drinks of alcohol     Types: 4 Standard drinks or equivalent per week     Substance and Sexual Activity   Drug Use Yes    Types: Marijuana       Allergies  Patient has no known allergies.     Medications  No medications prior to admission.       Objective    Last Vitals  Temp Pulse Resp BP MAP O2 Sat   36.1 °C (97 °F) 87 18 122/85   99 %     Physical Examination  Gen: NAD  HEENT: NCAT  Resp: normal work of breathing on room air  Card: regular rate  Neuro: grossly intact   Psych: approipriate  Motor: moving all extremities spontaneously   Abdomen: Non distended      A/P  27yo  p/f  bilateral tubal sterilization  PAT - not indicated   consent federal signed     Dw Dr Colin Huntley MD  OB/GYN PGY3

## 2024-01-04 NOTE — ANESTHESIA PREPROCEDURE EVALUATION
Patient: Rocio Jones    Procedure Information       Date/Time: 01/04/24 0715    Procedure: Salpingectomy Laparoscopy (Bilateral)    Location: [facility] [address] OR 04 / Virtual [facility] [address] OR    Surgeons: Roxann Shaw MD            Relevant Problems   Anesthesia (within normal limits)   (-) PONV (postoperative nausea and vomiting)      Cardiovascular (within normal limits)      Hematology   (+) Anemia affecting pregnancy      Infectious Disease   (+) BV (bacterial vaginosis)   (+) Candidiasis       Clinical information reviewed:   Tobacco  Allergies  Meds   Med Hx  Surg Hx   Fam Hx  Soc Hx        NPO Detail:  NPO/Void Status  Date of Last Liquid: 01/04/24  Time of Last Liquid: 0000  Date of Last Solid: 01/04/24  Time of Last Solid: 0000         Physical Exam    Airway  Mallampati: II  TM distance: >3 FB  Neck ROM: full     Cardiovascular   Rhythm: regular  Rate: normal     Dental    Pulmonary   Breath sounds clear to auscultation     Abdominal            Anesthesia Plan    ASA 2     general     intravenous induction   Anesthetic plan and risks discussed with patient.    Plan discussed with CAA and attending.

## 2024-01-04 NOTE — OP NOTE
Date: 2024  OR Location: [facility] OR    Name: Rocio Jones, : 1995, Age: 28 y.o., MRN: [id number], Sex: female    Diagnosis  Pre-op Diagnosis     * Request for sterilization [Z30.2] Post-op Diagnosis     * Request for sterilization [Z30.2]     Procedures  Salpingectomy Laparoscopy  14123 - [place] LAPAROSCOPY W/RMVL ADNEXAL STRUCTURES      Surgeons      * Roxann Shaw - Primary    Resident/Fellow/Other Assistant:  Surgeon(s) and Role:    Procedure Summary  Anesthesia: General  ASA: II  Anesthesia Staff: Anesthesiologist: DO ROSITA Kapadia-AA: JOSELUIS Rodriguez  Estimated Blood Loss: 10mL  Intra-op Medications:   Medication Name Total Dose   BUPivacaine HCl (Marcaine) 0.5 % (5 mg/mL) injection 12 mL   sodium chloride 0.9 % irrigation solution 1,000 mL   surgical lubricant gel 1 Application   sterile water irrigation solution 3,000 mL              Anesthesia Record               Intraprocedure I/O Totals          Output    Urine 250 mL    Est. Blood Loss 10 mL    Total Output 260 mL          Specimen:   ID Type Source Tests Collected by Time   1 : bilateral fallopian tubes Tissue FALLOPIAN TUBE SALPINGECTOMY LEFT AND RIGHT SURGICAL PATHOLOGY EXAM Roxann Shaw MD 2024 [id number]        Staff:   Circulator: Dafne Olivera RN  Scrub Person: Digna Cabezas RN         Procedure Details:  The patient was seen in the preoperative area. Preoperative antibiotics are not indicated. Venous thrombosis prophylaxis have been ordered including bilateral sequential compression devices    Findings: normal appearing uterus with 1cm serosal fibroid at left cornu, normal appearing fallopian tubes and ovaries    After an informed consent was obtained, the patient was taken to the operating room and the general anesthetic was administered. She was then positioned in the dorsal lithotomy position and prepped and draped in the normal sterile fashion. Once the anesthetic was found to be adequate, a bimanual exam was  performed. Murcia catheter was placed. A speculum was then placed in the vagina. The cervix was visualized and the anterior lip of the cervix was grasped with the single tooth tenaculum. At this point, the [place] uterine manipulator was placed in the cervix and the speculum was removed.     Next, attention was then turned to the abdomen. A 1 cm incision was made at the base of the umbilicus. The fascia was then entered via open Lamar technique. A 10 mm trocar port was placed and connected to the CO2 gas. The abdomen was insufflated to an adequate distension. The camera was inserted and confirmed position in the abdominal cavity. Initial survey of the abdomen found it to be devoid of trauma from entry with normal appearing liver edge, bowel, and uterus. Right and left fallopian tube and ovary appeared normal. The patient was placed in Trendelenburg.  Two additional 5 mm trocar ports were placed in the LLQ and RLQ under direct visualization. Bilateral ureters were visualized transperitoneally.    Attention was turned to the left fallopian tube. The LigaSure device was used to transect along the mesosalpinx inferior to the fallopian tube and then transect the fallopian tube at the level of the cornu. What appeared to be, and palpated firm was a subserosal fibroid at the insertion of the left fallopian tube/left cornu.  The fallopian tube was removed through a 5 mm port under direct visualization. The left mesosalpinx was found to be hemostatic. Attention was then turned to the right fallopian tube, which was transected in the same fashion. The right mesosalpinx was also found to be hemostatic. These were also evaluated at a lower intraperitoneal pressure.    At this point, the two 5 mm ports and 10 mm port were removed. The CO2 gas was disconnected and the abdomen was desufflated. The fascia at the 10 mm port was closed with an 0-vicryl suture. All laparoscopic incisions were closed with a 4-0 Vicryl in a subcuticular  interrupted fashion. Dermabond was placed on the lateral incisions. A 2x2 and Tegaderm were placed on the umbilical incision. At the end of the procedure, the uterine manipulator and Murcia were removed from the cervix and urethra respectively, and the patient was taken to recovery in stable condition. The patient tolerated the procedure well. Sponge, lap, and needle counts were correct x2. Dr. Shaw was present for the entirety of the procedure. She was discharged home with a postoperative medications and instruction to follow up with Dr. Shaw in two weeks.        Complications:  None; patient tolerated the procedure well.     Disposition: PACU - hemodynamically stable.  Condition: stable      Madyson Nina MD PGY-1  Obstetrics & Gynecology    I had previously signed this note, but editing to add the following:  I was present for the entirety of the procedure(s).     Rxoann Shaw MD

## 2024-01-04 NOTE — ANESTHESIA POSTPROCEDURE EVALUATION
Patient: Rocio Jones    Procedure Summary       Date: 01/04/24 Room / Location: [facility] OR 04 / Virtual [facility] OR    Anesthesia Start: 0729 Anesthesia Stop: 0932    Procedure: Salpingectomy Laparoscopy (Bilateral) Diagnosis:       Request for sterilization      (Request for sterilization [Z30.2]   sdr/kh/12/1)    Surgeons: Roxann Shaw MD Responsible Provider: Florin Kellogg DO    Anesthesia Type: general ASA Status: 2            Anesthesia Type: general    Vitals Value Taken Time   /81 01/04/24 0925   Temp 36.3 °C (97.3 °F) 01/04/24 0925   Pulse 83 01/04/24 0930   Resp 16 01/04/24 0930   SpO2 100 % 01/04/24 0930   Vitals shown include unvalidated device data.    Anesthesia Post Evaluation    Patient location during evaluation: bedside  Patient participation: complete - patient participated  Level of consciousness: awake  Pain management: adequate  Airway patency: patent  Cardiovascular status: acceptable  Respiratory status: acceptable and face mask  Hydration status: acceptable  Postoperative Nausea and Vomiting: none        No notable events documented.

## 2024-01-05 ASSESSMENT — PAIN SCALES - GENERAL: PAINLEVEL_OUTOF10: 2

## 2024-01-08 LAB
LABORATORY COMMENT REPORT: NORMAL
PATH REPORT.FINAL DX SPEC: NORMAL
PATH REPORT.GROSS SPEC: NORMAL
PATH REPORT.RELEVANT HX SPEC: NORMAL
PATH REPORT.TOTAL CANCER: NORMAL

## 2024-01-08 NOTE — PROGRESS NOTES
I saw and evaluated the patient. I personally obtained the key and critical portions of the history and physical exam or was physically present for key and critical portions performed by the resident/fellow. I reviewed the resident/fellow's documentation and discussed the patient with the resident/fellow. I agree with the resident/fellow's medical decision making as documented in the note.    Roxann Shaw MD

## 2024-01-26 ENCOUNTER — OFFICE VISIT (OUTPATIENT)
Dept: OBSTETRICS AND GYNECOLOGY | Facility: CLINIC | Age: 29
End: 2024-01-26
Payer: COMMERCIAL

## 2024-01-26 VITALS
BODY MASS INDEX: 36.37 KG/M2 | DIASTOLIC BLOOD PRESSURE: 86 MMHG | SYSTOLIC BLOOD PRESSURE: 127 MMHG | HEART RATE: 106 BPM | WEIGHT: 211.9 LBS

## 2024-01-26 DIAGNOSIS — Z98.890 S/P LAPAROSCOPY: Primary | ICD-10-CM

## 2024-01-26 PROCEDURE — 1036F TOBACCO NON-USER: CPT | Performed by: STUDENT IN AN ORGANIZED HEALTH CARE EDUCATION/TRAINING PROGRAM

## 2024-01-26 PROCEDURE — 99213 OFFICE O/P EST LOW 20 MIN: CPT | Mod: GE | Performed by: STUDENT IN AN ORGANIZED HEALTH CARE EDUCATION/TRAINING PROGRAM

## 2024-01-26 PROCEDURE — 99213 OFFICE O/P EST LOW 20 MIN: CPT | Performed by: STUDENT IN AN ORGANIZED HEALTH CARE EDUCATION/TRAINING PROGRAM

## 2024-01-26 ASSESSMENT — ENCOUNTER SYMPTOMS
GASTROINTESTINAL NEGATIVE: 0
CONSTITUTIONAL NEGATIVE: 0
MUSCULOSKELETAL NEGATIVE: 0
HEMATOLOGIC/LYMPHATIC NEGATIVE: 0
EYES NEGATIVE: 0
RESPIRATORY NEGATIVE: 0
PSYCHIATRIC NEGATIVE: 0
ALLERGIC/IMMUNOLOGIC NEGATIVE: 0
CARDIOVASCULAR NEGATIVE: 0
NEUROLOGICAL NEGATIVE: 0
ENDOCRINE NEGATIVE: 0

## 2024-01-26 ASSESSMENT — PAIN SCALES - GENERAL: PAINLEVEL: 0-NO PAIN

## 2024-01-26 NOTE — PROGRESS NOTES
Subjective   Patient ID: Rocio Jones is a 28 y.o. female who presents for follow up from a tubal  (Patient decline the Covid and flu shot this visit.).  29yo P3 now s/p laparoscopic bilateral salpingectomy on 1/4/23, presenting for FUV    Patient reports minimal pain, had not used any narcotic medications. No issues since. Has had a period without issue. Incisions are well healing. No complaints today.         Review of Systems   All other systems reviewed and are negative.      Objective   Physical Exam  Vitals reviewed.   Constitutional:       Appearance: Normal appearance.   HENT:      Head: Normocephalic and atraumatic.      Mouth/Throat:      Mouth: Mucous membranes are moist.   Eyes:      Extraocular Movements: Extraocular movements intact.      Conjunctiva/sclera: Conjunctivae normal.      Pupils: Pupils are equal, round, and reactive to light.   Cardiovascular:      Rate and Rhythm: Normal rate.   Pulmonary:      Effort: Pulmonary effort is normal.   Abdominal:      General: A surgical scar is present.      Palpations: Abdomen is soft.      Comments: Well healed incisions   Musculoskeletal:         General: Normal range of motion.      Cervical back: Normal range of motion and neck supple.   Skin:     General: Skin is warm and dry.   Neurological:      General: No focal deficit present.      Mental Status: She is alert.   Psychiatric:         Mood and Affect: Mood normal.         Behavior: Behavior normal.         Thought Content: Thought content normal.         Judgment: Judgment normal.         Assessment/Plan   Problem List Items Addressed This Visit             ICD-10-CM    S/P laparoscopy - Primary Z98.890     Well-healing. No concerns.          RTC for annual in fall/winter.        Charla Kimball MD 01/26/24 12:58 PM

## 2024-03-05 ENCOUNTER — OFFICE VISIT (OUTPATIENT)
Dept: PRIMARY CARE | Facility: CLINIC | Age: 29
End: 2024-03-05
Payer: COMMERCIAL

## 2024-03-05 ENCOUNTER — LAB (OUTPATIENT)
Dept: LAB | Facility: LAB | Age: 29
End: 2024-03-05
Payer: COMMERCIAL

## 2024-03-05 VITALS
HEART RATE: 100 BPM | DIASTOLIC BLOOD PRESSURE: 83 MMHG | HEIGHT: 64 IN | SYSTOLIC BLOOD PRESSURE: 121 MMHG | WEIGHT: 209 LBS | BODY MASS INDEX: 35.68 KG/M2 | OXYGEN SATURATION: 96 %

## 2024-03-05 DIAGNOSIS — E66.9 CLASS II OBESITY: ICD-10-CM

## 2024-03-05 DIAGNOSIS — E28.2 PCOS (POLYCYSTIC OVARIAN SYNDROME): Primary | ICD-10-CM

## 2024-03-05 DIAGNOSIS — E28.2 PCOS (POLYCYSTIC OVARIAN SYNDROME): ICD-10-CM

## 2024-03-05 LAB
CHOLEST SERPL-MCNC: 140 MG/DL (ref 0–199)
CHOLESTEROL/HDL RATIO: 5.2
EST. AVERAGE GLUCOSE BLD GHB EST-MCNC: 103 MG/DL
HBA1C MFR BLD: 5.2 %
HDLC SERPL-MCNC: 27.1 MG/DL
LDLC SERPL CALC-MCNC: 87 MG/DL
NON HDL CHOLESTEROL: 113 MG/DL (ref 0–149)
TRIGL SERPL-MCNC: 132 MG/DL (ref 0–149)
VLDL: 26 MG/DL (ref 0–40)

## 2024-03-05 PROCEDURE — 84402 ASSAY OF FREE TESTOSTERONE: CPT

## 2024-03-05 PROCEDURE — 1036F TOBACCO NON-USER: CPT | Performed by: FAMILY MEDICINE

## 2024-03-05 PROCEDURE — 99214 OFFICE O/P EST MOD 30 MIN: CPT | Performed by: FAMILY MEDICINE

## 2024-03-05 PROCEDURE — 80061 LIPID PANEL: CPT

## 2024-03-05 PROCEDURE — 36415 COLL VENOUS BLD VENIPUNCTURE: CPT

## 2024-03-05 PROCEDURE — 83036 HEMOGLOBIN GLYCOSYLATED A1C: CPT

## 2024-03-05 ASSESSMENT — ENCOUNTER SYMPTOMS
OCCASIONAL FEELINGS OF UNSTEADINESS: 0
DEPRESSION: 0
LOSS OF SENSATION IN FEET: 0

## 2024-03-05 ASSESSMENT — PAIN SCALES - GENERAL: PAINLEVEL: 0-NO PAIN

## 2024-03-05 NOTE — PROGRESS NOTES
"Subjective   Patient ID: Rocio Jones is a 28 y.o. female who presents for No chief complaint on file..    HPI   Rocio Jones is a     Review of Systems    Objective   Ht 1.626 m (5' 4\")   Wt 94.8 kg (209 lb)   BMI 35.87 kg/m²     Physical Exam    Assessment/Plan          "

## 2024-03-05 NOTE — PROGRESS NOTES
Rocio Erickson is a 28 y.o. female here for initial evaluation for treatment of obesity.        Tobacco Use: Low Risk  (3/5/2024)    Patient History     Smoking Tobacco Use: Never     Smokeless Tobacco Use: Never     Passive Exposure: Not on file     Alcohol Use: Not on file     She has been generally healty - diagnosed with PCOS in 2019. No other metabolic conditions. She has a surgical history of a breast reduction and a tubal ligation.     The family history is significant for  a mother who is just 44 and healthy. Her father's health is unknown. She has three young healthy children. She lives with her boyfriend who is also healthy. No other significant family medical history.     She has tried losing weight on her own by walking on the treadmill in her apartment complex and trying to eat healthier in general, by for example, stopping red meat.     On a typical day, Rocio Jones wakes up at  around 6:30AM, and skips breakfast. She first eats around noon, typically a turkey sandwich. 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 She snacks on chips and sometimes yogurt during the day. She finishes work at 6PM (starts at 9AM) and has dinner around 7PM. She had a [employer] meal yesterday. No late night eating. She drinks water and lemonade. She uses her treadmill on occasion but is largely sedentary.    She feels well and is motivated to lose weight to have more energy to look after her three young children.     On physical examination   Vitals:    03/05/24 1133   BP: 121/83   Pulse: 100   SpO2: 96%       Body mass index is 35.87 kg/m².  Pulmonary, cardiovascular, and abdominal exams are normal.     Overall Impression: Pleasant young woman with a number of unhealthy habits.     Goals included our Standard Fitter me goals with implementation counseling by Dr. Brett Hazel. Followup in 4 months. Also, obtained total and free testosterone (to confirm PCOS), fasting lipid panel and screening HbA1C% today.     No food or  drink after 7PM, Drink only water at all times., Have a protein-rich breakfast within 30 minutes of waking up., and Thirty minutes of continuous physical activity 7 days a week.

## 2024-03-12 LAB
TESTOSTERONE FREE (CHAN): 2.9 PG/ML (ref 0.1–6.4)
TESTOSTERONE,TOTAL,LC-MS/MS: 20 NG/DL (ref 2–45)

## 2024-07-23 ENCOUNTER — OFFICE VISIT (OUTPATIENT)
Dept: PRIMARY CARE | Facility: CLINIC | Age: 29
End: 2024-07-23
Payer: COMMERCIAL

## 2024-07-23 VITALS
HEART RATE: 91 BPM | SYSTOLIC BLOOD PRESSURE: 109 MMHG | WEIGHT: 210 LBS | HEIGHT: 64 IN | OXYGEN SATURATION: 98 % | BODY MASS INDEX: 35.85 KG/M2 | DIASTOLIC BLOOD PRESSURE: 76 MMHG

## 2024-07-23 DIAGNOSIS — E66.9 OBESITY (BMI 30-39.9): Primary | ICD-10-CM

## 2024-07-23 DIAGNOSIS — E28.2 PCOS (POLYCYSTIC OVARIAN SYNDROME): ICD-10-CM

## 2024-07-23 PROCEDURE — 99213 OFFICE O/P EST LOW 20 MIN: CPT | Performed by: FAMILY MEDICINE

## 2024-07-23 PROCEDURE — 3008F BODY MASS INDEX DOCD: CPT | Performed by: FAMILY MEDICINE

## 2024-07-23 PROCEDURE — 1036F TOBACCO NON-USER: CPT | Performed by: FAMILY MEDICINE

## 2024-07-23 RX ORDER — BUPROPION HYDROCHLORIDE 150 MG/1
150 TABLET ORAL EVERY MORNING
Qty: 90 TABLET | Refills: 1 | Status: SHIPPED | OUTPATIENT
Start: 2024-07-23 | End: 2025-01-19

## 2024-07-23 RX ORDER — TOPIRAMATE 100 MG/1
100 TABLET, FILM COATED ORAL DAILY
Qty: 90 TABLET | Refills: 1 | Status: SHIPPED | OUTPATIENT
Start: 2024-07-23 | End: 2025-01-19

## 2024-07-23 ASSESSMENT — COLUMBIA-SUICIDE SEVERITY RATING SCALE - C-SSRS
2. HAVE YOU ACTUALLY HAD ANY THOUGHTS OF KILLING YOURSELF?: NO
1. IN THE PAST MONTH, HAVE YOU WISHED YOU WERE DEAD OR WISHED YOU COULD GO TO SLEEP AND NOT WAKE UP?: NO
6. HAVE YOU EVER DONE ANYTHING, STARTED TO DO ANYTHING, OR PREPARED TO DO ANYTHING TO END YOUR LIFE?: NO

## 2024-07-23 ASSESSMENT — PATIENT HEALTH QUESTIONNAIRE - PHQ9
1. LITTLE INTEREST OR PLEASURE IN DOING THINGS: NOT AT ALL
SUM OF ALL RESPONSES TO PHQ9 QUESTIONS 1 AND 2: 0
2. FEELING DOWN, DEPRESSED OR HOPELESS: NOT AT ALL

## 2024-07-23 ASSESSMENT — PAIN SCALES - GENERAL: PAINLEVEL: 0-NO PAIN

## 2024-07-23 NOTE — PROGRESS NOTES
"Subjective   Patient ID: Rocio Jones is a 28 y.o. female who presents for No chief complaint on file..    Her weight is essentially unchanged since March 5th, when she weighed 209lbs (210lbs today). She states she has been drinking water exclusively, and exercising most days. She still eats quite late, often as late as 8:30PM, due to her schedule. Not surprisingly, she still skips breakfast often. She feels well overall.              Objective   /76 (BP Location: Left arm, Patient Position: Sitting, BP Cuff Size: Adult)   Pulse 91   Ht 1.626 m (5' 4\")   Wt 95.3 kg (210 lb)   SpO2 98%   BMI 36.05 kg/m²     Physical Exam  Constitutional:       Appearance: Normal appearance. She is obese. She is not ill-appearing.         Assessment/Plan :  I asked her to emphasize the 7PM goal, meeting it on days when possible, and even considering stopping eating by 6PM on days when it is possible. I told her this would facilitate the breakfast goal as well. She will continue to work on other goals. Trial of bupropion and topiramate. Warned about side effects of topiramate including dizziness, numbness and tingling, and asked to stop the medication if these manifest. F/U 4 months.          "

## 2024-08-12 ENCOUNTER — TELEPHONE (OUTPATIENT)
Dept: PRIMARY CARE | Facility: CLINIC | Age: 29
End: 2024-08-12
Payer: COMMERCIAL

## 2024-08-12 NOTE — TELEPHONE ENCOUNTER
Patient called and stated that she needs to speak with you about medication that was recently prescribed to her.

## 2024-10-03 ENCOUNTER — OFFICE VISIT (OUTPATIENT)
Dept: OBSTETRICS AND GYNECOLOGY | Facility: CLINIC | Age: 29
End: 2024-10-03
Payer: COMMERCIAL

## 2024-10-03 VITALS
SYSTOLIC BLOOD PRESSURE: 112 MMHG | WEIGHT: 201.3 LBS | DIASTOLIC BLOOD PRESSURE: 81 MMHG | BODY MASS INDEX: 34.55 KG/M2 | HEART RATE: 93 BPM

## 2024-10-03 DIAGNOSIS — Z11.3 ROUTINE SCREENING FOR STI (SEXUALLY TRANSMITTED INFECTION): Primary | ICD-10-CM

## 2024-10-03 DIAGNOSIS — N93.9 ABNORMAL UTERINE BLEEDING: ICD-10-CM

## 2024-10-03 DIAGNOSIS — Z30.430 ENCOUNTER FOR INSERTION OF 52 MG LEVONORGESTREL-RELEASING INTRAUTERINE DEVICE (IUD): ICD-10-CM

## 2024-10-03 PROCEDURE — 2500000004 HC RX 250 GENERAL PHARMACY W/ HCPCS (ALT 636 FOR OP/ED): Mod: SE

## 2024-10-03 PROCEDURE — 87661 TRICHOMONAS VAGINALIS AMPLIF: CPT

## 2024-10-03 PROCEDURE — 87591 N.GONORRHOEAE DNA AMP PROB: CPT

## 2024-10-03 PROCEDURE — 87491 CHLMYD TRACH DNA AMP PROBE: CPT

## 2024-10-03 ASSESSMENT — ENCOUNTER SYMPTOMS
ENDOCRINE NEGATIVE: 0
HEMATOLOGIC/LYMPHATIC NEGATIVE: 0
ALLERGIC/IMMUNOLOGIC NEGATIVE: 0
MUSCULOSKELETAL NEGATIVE: 0
CARDIOVASCULAR NEGATIVE: 0
EYES NEGATIVE: 0
PSYCHIATRIC NEGATIVE: 0
RESPIRATORY NEGATIVE: 0
NEUROLOGICAL NEGATIVE: 0
CONSTITUTIONAL NEGATIVE: 0
GASTROINTESTINAL NEGATIVE: 0

## 2024-10-03 ASSESSMENT — PAIN SCALES - GENERAL: PAINLEVEL: 6

## 2024-10-03 NOTE — PROGRESS NOTES
Gynecology Problem Visit  10/03/24    Chief complaint:   Chief Complaint   Patient presents with    Contraception     Pt here for birth control  Lmp-24  Last pap-23  Std-through urine  Chaperone-accepted        Rocio Jones is a 29 y.o.  here for birth control for abnormal uterine bleeding.     HPI:   Periods are regular every 28-30 days, lasting 6 days. Very heavy, uses tampons, goes through 5 a day, ultra-size. Cramping is bad during the period. Uses tylenol and heating pad, but they don't help. Would like some contraception to help with heavy bleeding and is interested in a hormonal IUD.     Pap history: 23 NILM    GynHx:  Ob hx: FTxSVD x3, hx of GDM w/prior pregnancy  Menses: 10 age onset  Sexual activity: Yes, feels safe with partner currently  Current contraception: S/p laparoscopic bilateral salpingectomy on 2023, has tried mirena IUD (didn't like as hurt during sex) and Nexplanon (made hormones all over place so didn't like) and OCPs (kept forgetting to take it). Is interested in the mirena IUD.   STIs: Requested    SocHx:  TAD: None.     Meds: none    Past med hx and past surg hx reviewed and notable for: breast reduction in     Objective   /81   Pulse 93   Wt 91.3 kg (201 lb 4.8 oz)   LMP 2024   BMI 34.55 kg/m²     General: Well appearing, alert  HEENT: normocephalic, EOMI, clear sclera  Cardio: Warm and well perfused  Resp: breathing comfortably on room air  Abd: soft, nontender, nondistended  :   External Genitalia: normal morphology, no lesions   Vagina: no abnormal discharge    Cervix: no lesions   Neuro: grossly intact, no focal deficits  Extremities: full ROM, no calf tenderness  Psych: A&O x3, appropriate mood and affect  Patient ID: Rocio Jones is a 29 y.o. female.    IUD Insertion    Performed by: Marge Crawley MD  Authorized by: Gia Burden MD    Procedure: IUD insertion    Consent obtained by patient, parent, or legal power of  -  including discussion of procedure risks and benefits, patient questions answered, and patient education provided: yes    Pregnancy risk: reasonably certain the patient is not pregnant    Immediately prior to procedure a time out was called: yes    Pelvic exam performed: yes    Speculum placed in vagina: yes    Cervix cleaned and prepped: yes    Tenaculum/Allis/Ring Forceps applied to cervix: yes    Anesthesia used: yes    Local anesthesia:  Paracervical  Local anesthetic:  Lidocaine  Anesthetic strength (%):  1  Volume (mL):  8  IUD inserted without complications: yes    OSM: levonorgestrel (Mirena) IUD 20 mcg/day  Strings trimmed to (cm):  3  Patient tolerated procedure well: yes    Inserted with ultrasound guidance: no    Transvaginal sono confirmed fundal placement: no    Intended removal date: 8 years    Insertion comments: Pressure was applied to the tenaculum site post-procedure with a cotton swab and site was deemed hemostatic.       Assessment and Plan:  Rocio Jones is a 29 y.o. female here for the following concerns we addressed today:    Routine screening for STI (sexually transmitted infection)  - STI screening ordered    Abnormal uterine bleeding  - pt reports dysmenorrhea and AUB  - pt requested hormonal IUD placement today and was consented  - mirena IUD ordered and inserted today (see procedure note)       Orders Placed This Encounter   Procedures    IUD Insertion     This order was created via procedure documentation    HIV 1/2 Antigen/Antibody Screen with Reflex to Confirmation     Standing Status:   Future     Standing Expiration Date:   10/3/2025     Order Specific Question:   Release result to [facility]     Answer:   Immediate [1]    Syphilis Screen with Reflex     Standing Status:   Future     Standing Expiration Date:   10/3/2025     Order Specific Question:   Release result to MyChart     Answer:   Immediate [1]    Hepatitis C Antibody     Standing Status:   Future     Standing Expiration  Date:   10/3/2025     Order Specific Question:   Release result to MyChart     Answer:   Immediate [1]    Hepatitis B Surface Antigen     Standing Status:   Future     Standing Expiration Date:   10/3/2025     Order Specific Question:   Release result to MyChart     Answer:   Immediate [1]    Neisseria gonorrhoeae, Amplified     Standing Status:   Future     Number of Occurrences:   1     Standing Expiration Date:   11/3/2024     Order Specific Question:   Release result to MyChart     Answer:   Immediate    Chlamydia trachomatis, Amplified     Standing Status:   Future     Number of Occurrences:   1     Standing Expiration Date:   11/3/2024     Order Specific Question:   Release result to MyChart     Answer:   Immediate    Trichomonas vaginalis, Amplified     Standing Status:   Future     Number of Occurrences:   1     Standing Expiration Date:   11/3/2024     Order Specific Question:   Release result to MyChart     Answer:   Immediate          RTC in 3-4 weeks for string check  Patient seen and discussed with Dr. Bertram Crawley MD  PGY-1, Obstetrics and Gynecology

## 2024-10-03 NOTE — ASSESSMENT & PLAN NOTE
- pt reports dysmenorrhea and AUB  - pt requested hormonal IUD placement today and was consented  - mirena IUD ordered and inserted today (see procedure note)

## 2024-10-07 ENCOUNTER — APPOINTMENT (OUTPATIENT)
Dept: PRIMARY CARE | Facility: CLINIC | Age: 29
End: 2024-10-07
Payer: COMMERCIAL

## 2024-10-07 VITALS
BODY MASS INDEX: 33.97 KG/M2 | HEART RATE: 85 BPM | DIASTOLIC BLOOD PRESSURE: 82 MMHG | WEIGHT: 199 LBS | TEMPERATURE: 97.7 F | SYSTOLIC BLOOD PRESSURE: 120 MMHG | HEIGHT: 64 IN | OXYGEN SATURATION: 98 %

## 2024-10-07 DIAGNOSIS — E66.9 OBESITY (BMI 30-39.9): Primary | ICD-10-CM

## 2024-10-07 PROBLEM — Z30.2 REQUEST FOR STERILIZATION: Status: RESOLVED | Noted: 2023-11-29 | Resolved: 2024-10-07

## 2024-10-07 PROBLEM — N89.8 VAGINAL DISCHARGE: Status: RESOLVED | Noted: 2023-11-05 | Resolved: 2024-10-07

## 2024-10-07 PROBLEM — N76.0 BV (BACTERIAL VAGINOSIS): Status: RESOLVED | Noted: 2023-11-05 | Resolved: 2024-10-07

## 2024-10-07 PROBLEM — R30.0 DYSURIA: Status: RESOLVED | Noted: 2023-11-05 | Resolved: 2024-10-07

## 2024-10-07 PROBLEM — Z11.3 ROUTINE SCREENING FOR STI (SEXUALLY TRANSMITTED INFECTION): Status: RESOLVED | Noted: 2024-10-03 | Resolved: 2024-10-07

## 2024-10-07 PROBLEM — R31.9 HEMATURIA: Status: RESOLVED | Noted: 2023-11-05 | Resolved: 2024-10-07

## 2024-10-07 PROBLEM — B96.89 BV (BACTERIAL VAGINOSIS): Status: RESOLVED | Noted: 2023-11-05 | Resolved: 2024-10-07

## 2024-10-07 PROCEDURE — 99213 OFFICE O/P EST LOW 20 MIN: CPT | Performed by: FAMILY MEDICINE

## 2024-10-07 PROCEDURE — 3008F BODY MASS INDEX DOCD: CPT | Performed by: FAMILY MEDICINE

## 2024-10-07 PROCEDURE — 1036F TOBACCO NON-USER: CPT | Performed by: FAMILY MEDICINE

## 2024-10-07 ASSESSMENT — ENCOUNTER SYMPTOMS: CONSTITUTIONAL NEGATIVE: 1

## 2024-10-07 ASSESSMENT — PAIN SCALES - GENERAL: PAINLEVEL: 0-NO PAIN

## 2024-10-07 NOTE — PROGRESS NOTES
"Subjective   Patient ID: Rocio Jones is a 29 y.o. female who presents for Follow-up.    Rocio is doing exceptionally well. She has lost 11lbs since her last visit 2.5 months ago. She is stopping eating at 7:30PM which is an improvement. She still skips breakfast and is only physically active 2-3 days a week. She drinks only water. She feels well.         Review of Systems   Constitutional: Negative.        Objective   /82 (BP Location: Right arm, Patient Position: Sitting)   Pulse 85   Temp 36.5 °C (97.7 °F) (Temporal)   Ht 1.613 m (5' 3.5\")   Wt 90.3 kg (199 lb)   LMP 09/19/2024   SpO2 98%   BMI 34.70 kg/m²     Physical Exam  Constitutional:       Appearance: Normal appearance. She is obese.     Weight is 199lbs down from 210lbs.     Assessment/Plan :  Exceptional results. Reenforced all goals. F/U 3 months.          "

## 2024-10-08 NOTE — PROGRESS NOTES
I saw and evaluated the patient. I personally obtained the key and critical portions of the history and physical exam or was physically present for key and critical portions performed by the resident/fellow. I reviewed the resident/fellow's documentation and discussed the patient with the resident/fellow. I agree with the resident/fellow's medical decision making as documented in the note.    Gia Burden MD

## 2024-11-19 ENCOUNTER — LAB (OUTPATIENT)
Dept: LAB | Facility: LAB | Age: 29
End: 2024-11-19
Payer: COMMERCIAL

## 2024-11-19 ENCOUNTER — OFFICE VISIT (OUTPATIENT)
Dept: OBSTETRICS AND GYNECOLOGY | Facility: CLINIC | Age: 29
End: 2024-11-19
Payer: COMMERCIAL

## 2024-11-19 VITALS
BODY MASS INDEX: 34.57 KG/M2 | WEIGHT: 202.5 LBS | HEIGHT: 64 IN | DIASTOLIC BLOOD PRESSURE: 89 MMHG | SYSTOLIC BLOOD PRESSURE: 128 MMHG | HEART RATE: 86 BPM

## 2024-11-19 DIAGNOSIS — Z01.419 WELL WOMAN EXAM WITH ROUTINE GYNECOLOGICAL EXAM: Primary | ICD-10-CM

## 2024-11-19 DIAGNOSIS — Z30.2 REQUEST FOR STERILIZATION: ICD-10-CM

## 2024-11-19 DIAGNOSIS — Z11.3 ROUTINE SCREENING FOR STI (SEXUALLY TRANSMITTED INFECTION): ICD-10-CM

## 2024-11-19 LAB
ABO GROUP (TYPE) IN BLOOD: NORMAL
ANTIBODY SCREEN: NORMAL
ERYTHROCYTE [DISTWIDTH] IN BLOOD BY AUTOMATED COUNT: 12.8 % (ref 11.5–14.5)
HBV SURFACE AG SERPL QL IA: NONREACTIVE
HCT VFR BLD AUTO: 40.2 % (ref 36–46)
HCV AB SER QL: NONREACTIVE
HGB BLD-MCNC: 13.2 G/DL (ref 12–16)
HIV 1+2 AB+HIV1 P24 AG SERPL QL IA: NONREACTIVE
MCH RBC QN AUTO: 27.7 PG (ref 26–34)
MCHC RBC AUTO-ENTMCNC: 32.8 G/DL (ref 32–36)
MCV RBC AUTO: 85 FL (ref 80–100)
NRBC BLD-RTO: 0 /100 WBCS (ref 0–0)
PLATELET # BLD AUTO: 258 X10*3/UL (ref 150–450)
RBC # BLD AUTO: 4.76 X10*6/UL (ref 4–5.2)
RH FACTOR (ANTIGEN D): NORMAL
TREPONEMA PALLIDUM IGG+IGM AB [PRESENCE] IN SERUM OR PLASMA BY IMMUNOASSAY: NONREACTIVE
WBC # BLD AUTO: 5.3 X10*3/UL (ref 4.4–11.3)

## 2024-11-19 PROCEDURE — 86901 BLOOD TYPING SEROLOGIC RH(D): CPT

## 2024-11-19 PROCEDURE — 99212 OFFICE O/P EST SF 10 MIN: CPT | Mod: GC

## 2024-11-19 PROCEDURE — 86803 HEPATITIS C AB TEST: CPT

## 2024-11-19 PROCEDURE — 86780 TREPONEMA PALLIDUM: CPT

## 2024-11-19 PROCEDURE — 36415 COLL VENOUS BLD VENIPUNCTURE: CPT

## 2024-11-19 PROCEDURE — 86850 RBC ANTIBODY SCREEN: CPT

## 2024-11-19 PROCEDURE — 99395 PREV VISIT EST AGE 18-39: CPT

## 2024-11-19 PROCEDURE — 87389 HIV-1 AG W/HIV-1&-2 AB AG IA: CPT

## 2024-11-19 PROCEDURE — 87661 TRICHOMONAS VAGINALIS AMPLIF: CPT

## 2024-11-19 PROCEDURE — 87591 N.GONORRHOEAE DNA AMP PROB: CPT

## 2024-11-19 PROCEDURE — 87491 CHLMYD TRACH DNA AMP PROBE: CPT

## 2024-11-19 PROCEDURE — 87340 HEPATITIS B SURFACE AG IA: CPT

## 2024-11-19 PROCEDURE — 85027 COMPLETE CBC AUTOMATED: CPT

## 2024-11-19 PROCEDURE — 86900 BLOOD TYPING SEROLOGIC ABO: CPT

## 2024-11-19 ASSESSMENT — ENCOUNTER SYMPTOMS
CARDIOVASCULAR NEGATIVE: 0
HEMATOLOGIC/LYMPHATIC NEGATIVE: 0
RESPIRATORY NEGATIVE: 0
PSYCHIATRIC NEGATIVE: 0
EYES NEGATIVE: 0
ENDOCRINE NEGATIVE: 0
NEUROLOGICAL NEGATIVE: 0
ALLERGIC/IMMUNOLOGIC NEGATIVE: 0
GASTROINTESTINAL NEGATIVE: 0
CONSTITUTIONAL NEGATIVE: 0
MUSCULOSKELETAL NEGATIVE: 0

## 2024-11-19 NOTE — LETTER
November 19, 2024     Patient: Rocio Jones   YOB: 1995   Date of Visit: 11/19/2024       To Whom It May Concern:    Rocio Jones was seen in my clinic on 11/19/2024 at 8:30 am. Please excuse Rocio for her absence from work on this day to make the appointment.    If you have any questions or concerns, please don't hesitate to call.         Sincerely,         Marge Crawley MD        CC: No Recipients

## 2024-11-19 NOTE — PROGRESS NOTES
"Gynecology Problem Visit  24    Chief complaint:   Chief Complaint   Patient presents with    Annual Exam     Pt here for annual exam  Udb-29-18-24  Last pap-23 wnl  Std-all blood,urine  Chaperone-accepted       Rocio Jones is a 29 y.o.  here for annual    HPI: When she first got her mirena IUD, bled for a month straight. Then no bleeding and now back bleeding. Not as heavy as it used to be. Had questions about diabetes today. But is engaged with a nutritionist and appears to have been screened in March.     Annual   Mammogram: N/A  Pap history: 23 NILM, HPV vax completed  Exercise: Daily walks for exercise  Nutrition: Cooks at home. Does not eat red meat  Mood: Denies feeling depressed. Has some anxiety but uses MJ to help with it.   Wears a seatbelt    GynHx:  Ob hx: 3 FT x   Menses: 4 pads/day, LMP . Medium bleeding. Has associated normal cramping.   Periods are regular every 28-30 days, lasting 5 days.  Sexual activity: Yes, with the same partner. Feels safe.   Current contraception: S/p lps bilateral salpingectomy on 23, IUD in place (10/3/2024)  STIs: Would like today.     SocHx:  TAD: MJ, never smoker, no alcohol use    Fam Hx: maternal and paternal grandmother breast cancer (diagnosed at age 64 and possibly 30s).     Past med hx and past surg hx reviewed and notable for: breast reduction     Objective   /89   Pulse 86   Ht 1.626 m (5' 4\")   Wt 91.9 kg (202 lb 8 oz)   LMP 2024   BMI 34.76 kg/m²     General: Well appearing, alert  HEENT: normocephalic, EOMI, clear sclera  Cardio: Warm and well perfused  Resp: breathing comfortably on room air  Breast: Normal to palpation bilaterally without masses, skin changes, or nipple discharge.   Abd: soft, nontender, nondistended  :   External Genitalia: normal morphology, no lesions   Vagina: no abnormal discharge    Cervix: no lesions, strings visualized   Uterus: small, mobile, nontender    Adnexa: no masses, " nontender   Neuro: grossly intact, no focal deficits  Extremities: full ROM, no calf tenderness  Psych: A&O x3, appropriate mood and affect    Assessment     Assessment and Plan:  Rocio Jones is a 29 y.o. female here for the following concerns we addressed today:    Well woman exam with routine gynecological exam  - up to date on pap smear  - s/p laparoscopic bilateral salpingectomy  - pelvic and breast exam wnl, IUD strings visualized on exam  - STI screening ordered (blood screening still active)  - placed referral to [facility] breast clinic with family history of grandma dx at potentially age 30       Orders Placed This Encounter   Procedures    Neisseria gonorrhoeae, Amplified     Standing Status:   Future     Standing Expiration Date:   12/19/2024     Order Specific Question:   Release result to MyChart     Answer:   Immediate    Chlamydia trachomatis, Amplified     Standing Status:   Future     Standing Expiration Date:   12/19/2024     Order Specific Question:   Release result to MyChart     Answer:   Immediate    Trichomonas vaginalis, Amplified     Standing Status:   Future     Standing Expiration Date:   12/19/2024     Order Specific Question:   Release result to MyChart     Answer:   Immediate    Referral to High Risk Breast Cancer     Standing Status:   Future     Standing Expiration Date:   11/19/2025     Referral Priority:   Urgent     Referral Type:   Consultation     Referral Reason:   Specialty Services Required     Number of Visits Requested:   1        RTC in one year or earlier as needed  Patient seen and discussed with Dr. Soledad Crawley MD  PGY-1, Obstetrics and Gynecology

## 2024-11-19 NOTE — PROGRESS NOTES
I saw and evaluated the patient. I personally obtained the key and critical portions of the history and physical exam or was physically present for key and critical portions performed by the resident/fellow. I reviewed the resident/fellow's documentation and discussed the patient with the resident/fellow. I agree with the resident/fellow's medical decision making as documented in the note.    Michelle Rowe MD

## 2025-01-10 ENCOUNTER — HOSPITAL ENCOUNTER (OUTPATIENT)
Dept: RADIOLOGY | Facility: CLINIC | Age: 30
Discharge: HOME | End: 2025-01-10
Payer: COMMERCIAL

## 2025-01-10 ENCOUNTER — OFFICE VISIT (OUTPATIENT)
Dept: ORTHOPEDIC SURGERY | Facility: CLINIC | Age: 30
End: 2025-01-10
Payer: COMMERCIAL

## 2025-01-10 VITALS — WEIGHT: 202 LBS | HEIGHT: 64 IN | BODY MASS INDEX: 34.49 KG/M2

## 2025-01-10 DIAGNOSIS — M67.432 GANGLION OF LEFT WRIST: Primary | ICD-10-CM

## 2025-01-10 DIAGNOSIS — M67.432 GANGLION OF LEFT WRIST: ICD-10-CM

## 2025-01-10 PROCEDURE — 3008F BODY MASS INDEX DOCD: CPT | Performed by: STUDENT IN AN ORGANIZED HEALTH CARE EDUCATION/TRAINING PROGRAM

## 2025-01-10 PROCEDURE — 73130 X-RAY EXAM OF HAND: CPT | Mod: LT

## 2025-01-10 PROCEDURE — L3908 WHO COCK-UP NONMOLDE PRE OTS: HCPCS | Performed by: STUDENT IN AN ORGANIZED HEALTH CARE EDUCATION/TRAINING PROGRAM

## 2025-01-10 PROCEDURE — 99214 OFFICE O/P EST MOD 30 MIN: CPT | Performed by: STUDENT IN AN ORGANIZED HEALTH CARE EDUCATION/TRAINING PROGRAM

## 2025-01-10 PROCEDURE — 99204 OFFICE O/P NEW MOD 45 MIN: CPT | Performed by: STUDENT IN AN ORGANIZED HEALTH CARE EDUCATION/TRAINING PROGRAM

## 2025-01-10 PROCEDURE — 1036F TOBACCO NON-USER: CPT | Performed by: STUDENT IN AN ORGANIZED HEALTH CARE EDUCATION/TRAINING PROGRAM

## 2025-01-10 ASSESSMENT — PAIN - FUNCTIONAL ASSESSMENT: PAIN_FUNCTIONAL_ASSESSMENT: 0-10

## 2025-01-10 ASSESSMENT — PAIN SCALES - GENERAL: PAINLEVEL_OUTOF10: 5 - MODERATE PAIN

## 2025-01-10 ASSESSMENT — PAIN DESCRIPTION - DESCRIPTORS: DESCRIPTORS: ACHING;SORE

## 2025-01-10 NOTE — PROGRESS NOTES
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  Denies discrete trauma or injury.  Denies a history of rheumatoid arthritis    She explains that she has had a left dorsal wrist soft tissue mass for many years.  He was diagnosed with a dorsal ganglion by  hand provider approximately 5 years ago.  She underwent an in office needle aspiration which was effective for approximately 4 months then it recurred.  She has been tolerating it well for years however lately it has been very painful especially extremes of flexion and extension.  She denies any associated numbness tingling or paresthesias.  She denies any other soft tissue masses on her body.  She denies any spontaneous drainage or overlying skin changes.  She denies any constitutional symptoms    PHYSICAL EXAM    Extremities / Musculoskeletal:                  Left wrist:  Obvious grape sized dorsal soft tissue mass overlying the wrist.  No overlying skin changes.  Positive transillumination, nonpulsatile, negative Tinel's.  Focally tender to palpation.  Mass does not move with tendon excursion.  Full active wrist range of motion flexion extension with focal pain at the mass at terminal flexion extension  Full composite digital flexion full strong resisted extension MCP and IP joints.  Motor intact radial ulnar median AIN PIN.  Sensation tact light touch radial and median.  2+ radial warm well-perfused      IMAGING / LABS / EMGs:    Left wrist x-ray series obtained today and independent viewed demonstrating normal global alignment no obvious signs of acute fracture or dislocation.    Past Medical History:   Diagnosis Date    BV (bacterial  vaginosis) 11/05/2023    Dysuria 11/05/2023    Hematuria 11/05/2023    Request for sterilization 11/29/2023    Routine screening for STI (sexually transmitted infection) 10/03/2024    Vaginal discharge 11/05/2023       Medication Documentation Review Audit       Reviewed by Terri Lee LPN (Licensed Nurse) on 01/10/25 at 0907      Medication Order Taking? Sig Documenting Provider Last Dose Status   buPROPion XL (Wellbutrin XL) 150 mg 24 hr tablet [id number]  Take 1 tablet (150 mg) by mouth once daily in the morning. Do not crush, chew, or split. Saurabh Arroyo MD  Active   topiramate (Topamax) 100 mg tablet [id number]  Take 1 tablet (100 mg) by mouth once daily. Saurabh Arroyo MD  Active                    No Known Allergies    Past Surgical History:   Procedure Laterality Date    OTHER SURGICAL HISTORY  10/28/2021    Breast reduction         ASSESSMENT:   Left dorsal wrist soft tissue mass suspected recurrent ganglion    We a long discussion regarding my assessment as well as available treatment options with their associated risk benefits.  We discussed that an issue with needle aspiration is a high risk of recurrence.  We discussed that there is a high risk of recurrence with all treatment methods for this.  For now I would recommend conservatively treating this with a trial of immobilization in a removable wrist brace for 1 month.  This may minimize the size and focal pain related to this mass.  I would also like to obtain an MRI just to understand the radiographic features of the mass and the precise location should we pursue an excisional biopsy.    PLAN:   Patient fit for endograft removable cock up wrist brace  Stat MR left wrist without contrast ordered    Follow-up in: 1 month  XR at next visit: none      Chaparro Florentino M.D.    Department of Orthopaedics  Hand/Upper Extremity  Phone: [phone]  Appt. Phone: [phone]

## 2025-01-22 ENCOUNTER — APPOINTMENT (OUTPATIENT)
Dept: PRIMARY CARE | Facility: CLINIC | Age: 30
End: 2025-01-22
Payer: COMMERCIAL

## 2025-01-25 ENCOUNTER — HOSPITAL ENCOUNTER (OUTPATIENT)
Dept: RADIOLOGY | Facility: HOSPITAL | Age: 30
Discharge: HOME | End: 2025-01-25
Payer: COMMERCIAL

## 2025-01-25 DIAGNOSIS — M67.432 GANGLION OF LEFT WRIST: ICD-10-CM

## 2025-01-25 PROCEDURE — 73221 MRI JOINT UPR EXTREM W/O DYE: CPT | Mod: LEFT SIDE | Performed by: RADIOLOGY

## 2025-01-25 PROCEDURE — 73221 MRI JOINT UPR EXTREM W/O DYE: CPT | Mod: LT

## 2025-01-27 ENCOUNTER — APPOINTMENT (OUTPATIENT)
Dept: PRIMARY CARE | Facility: CLINIC | Age: 30
End: 2025-01-27
Payer: COMMERCIAL

## 2025-01-27 VITALS
OXYGEN SATURATION: 98 % | BODY MASS INDEX: 36.16 KG/M2 | HEART RATE: 102 BPM | HEIGHT: 63 IN | DIASTOLIC BLOOD PRESSURE: 84 MMHG | TEMPERATURE: 97.2 F | SYSTOLIC BLOOD PRESSURE: 158 MMHG | WEIGHT: 204.1 LBS

## 2025-01-27 DIAGNOSIS — E66.812 CLASS 2 OBESITY: Primary | ICD-10-CM

## 2025-01-27 PROCEDURE — 99214 OFFICE O/P EST MOD 30 MIN: CPT | Performed by: FAMILY MEDICINE

## 2025-01-27 PROCEDURE — 3008F BODY MASS INDEX DOCD: CPT | Performed by: FAMILY MEDICINE

## 2025-01-27 PROCEDURE — 1036F TOBACCO NON-USER: CPT | Performed by: FAMILY MEDICINE

## 2025-01-27 RX ORDER — PHENTERMINE HYDROCHLORIDE 37.5 MG/1
37.5 CAPSULE ORAL
Qty: 30 CAPSULE | Refills: 2 | Status: SHIPPED | OUTPATIENT
Start: 2025-01-27 | End: 2025-04-27

## 2025-01-27 ASSESSMENT — PATIENT HEALTH QUESTIONNAIRE - PHQ9
SUM OF ALL RESPONSES TO PHQ9 QUESTIONS 1 AND 2: 0
2. FEELING DOWN, DEPRESSED OR HOPELESS: NOT AT ALL
1. LITTLE INTEREST OR PLEASURE IN DOING THINGS: NOT AT ALL

## 2025-01-27 ASSESSMENT — COLUMBIA-SUICIDE SEVERITY RATING SCALE - C-SSRS
6. HAVE YOU EVER DONE ANYTHING, STARTED TO DO ANYTHING, OR PREPARED TO DO ANYTHING TO END YOUR LIFE?: NO
1. IN THE PAST MONTH, HAVE YOU WISHED YOU WERE DEAD OR WISHED YOU COULD GO TO SLEEP AND NOT WAKE UP?: NO
2. HAVE YOU ACTUALLY HAD ANY THOUGHTS OF KILLING YOURSELF?: NO

## 2025-01-27 ASSESSMENT — PAIN SCALES - GENERAL: PAINLEVEL_OUTOF10: 0-NO PAIN

## 2025-01-27 ASSESSMENT — ENCOUNTER SYMPTOMS
LOSS OF SENSATION IN FEET: 0
DEPRESSION: 0
CONSTITUTIONAL NEGATIVE: 1
OCCASIONAL FEELINGS OF UNSTEADINESS: 0

## 2025-01-27 NOTE — PROGRESS NOTES
"Subjective   Patient ID: Rocio Jones is a 29 y.o. female who presents for Follow-up (Right knee pain /).    Rocio has gained 5lbs over the past 3 months. She had been doing well with our basic goals, but has regained the weight mainly by eating after 7PM. She is also largely sedentary. She does have breakfast daily and does drink only water. She feels well.          Review of Systems   Constitutional: Negative.        Objective   /84 (BP Location: Right arm, Patient Position: Sitting, BP Cuff Size: Adult)   Pulse 102   Temp 36.2 °C (97.2 °F) (Temporal)   Ht 1.6 m (5' 3\")   Wt 92.6 kg (204 lb 1.6 oz)   SpO2 98%   BMI 36.15 kg/m²     Physical Exam  Constitutional:       Appearance: Normal appearance. She is obese.     Weight is 204lbs up from 199lbs in October 2024.    Assessment/Plan : She is eager to get back on track. Asked her to pack a dinner when she goes to work (7AM to 7PM). Also, trial of phentermine x 3 months. FU 4 months.   Diagnoses and all orders for this visit:  Class 2 obesity         "

## 2025-02-02 ENCOUNTER — APPOINTMENT (OUTPATIENT)
Dept: RADIOLOGY | Facility: HOSPITAL | Age: 30
End: 2025-02-02
Payer: COMMERCIAL

## 2025-02-02 ENCOUNTER — HOSPITAL ENCOUNTER (EMERGENCY)
Facility: HOSPITAL | Age: 30
Discharge: HOME | End: 2025-02-02
Attending: EMERGENCY MEDICINE
Payer: COMMERCIAL

## 2025-02-02 ENCOUNTER — CLINICAL SUPPORT (OUTPATIENT)
Dept: EMERGENCY MEDICINE | Facility: HOSPITAL | Age: 30
End: 2025-02-02
Payer: COMMERCIAL

## 2025-02-02 VITALS
SYSTOLIC BLOOD PRESSURE: 110 MMHG | HEART RATE: 89 BPM | DIASTOLIC BLOOD PRESSURE: 62 MMHG | WEIGHT: 205 LBS | TEMPERATURE: 97.7 F | OXYGEN SATURATION: 100 % | HEIGHT: 63 IN | RESPIRATION RATE: 16 BRPM | BODY MASS INDEX: 36.32 KG/M2

## 2025-02-02 DIAGNOSIS — R07.89 CHEST WALL PAIN: Primary | ICD-10-CM

## 2025-02-02 LAB
D DIMER PPP FEU-MCNC: 518 NG/ML FEU
HOLD SPECIMEN: NORMAL

## 2025-02-02 PROCEDURE — 71046 X-RAY EXAM CHEST 2 VIEWS: CPT | Performed by: RADIOLOGY

## 2025-02-02 PROCEDURE — 93005 ELECTROCARDIOGRAM TRACING: CPT

## 2025-02-02 PROCEDURE — 85379 FIBRIN DEGRADATION QUANT: CPT

## 2025-02-02 PROCEDURE — 99285 EMERGENCY DEPT VISIT HI MDM: CPT | Mod: 25 | Performed by: EMERGENCY MEDICINE

## 2025-02-02 PROCEDURE — 71046 X-RAY EXAM CHEST 2 VIEWS: CPT

## 2025-02-02 PROCEDURE — 36415 COLL VENOUS BLD VENIPUNCTURE: CPT | Performed by: EMERGENCY MEDICINE

## 2025-02-02 PROCEDURE — 2500000001 HC RX 250 WO HCPCS SELF ADMINISTERED DRUGS (ALT 637 FOR MEDICARE OP): Mod: SE

## 2025-02-02 PROCEDURE — 2500000005 HC RX 250 GENERAL PHARMACY W/O HCPCS: Mod: SE

## 2025-02-02 RX ORDER — LIDOCAINE 560 MG/1
1 PATCH PERCUTANEOUS; TOPICAL; TRANSDERMAL ONCE
Status: DISCONTINUED | OUTPATIENT
Start: 2025-02-02 | End: 2025-02-02 | Stop reason: HOSPADM

## 2025-02-02 RX ORDER — ACETAMINOPHEN 325 MG/1
975 TABLET ORAL EVERY 6 HOURS PRN
Qty: 30 TABLET | Refills: 0 | Status: SHIPPED | OUTPATIENT
Start: 2025-02-02

## 2025-02-02 RX ORDER — LIDOCAINE 50 MG/G
1 PATCH TOPICAL DAILY
Qty: 5 PATCH | Refills: 0 | Status: SHIPPED | OUTPATIENT
Start: 2025-02-02

## 2025-02-02 RX ORDER — ACETAMINOPHEN 325 MG/1
975 TABLET ORAL ONCE
Status: COMPLETED | OUTPATIENT
Start: 2025-02-02 | End: 2025-02-02

## 2025-02-02 RX ORDER — IBUPROFEN 600 MG/1
600 TABLET ORAL EVERY 6 HOURS PRN
Qty: 30 TABLET | Refills: 0 | Status: SHIPPED | OUTPATIENT
Start: 2025-02-02

## 2025-02-02 RX ADMIN — LIDOCAINE 1 PATCH: 560 PATCH PERCUTANEOUS; TOPICAL; TRANSDERMAL at 12:50

## 2025-02-02 RX ADMIN — ACETAMINOPHEN 975 MG: 325 TABLET ORAL at 12:50

## 2025-02-02 ASSESSMENT — LIFESTYLE VARIABLES
HAVE YOU EVER FELT YOU SHOULD CUT DOWN ON YOUR DRINKING: NO
EVER FELT BAD OR GUILTY ABOUT YOUR DRINKING: NO
TOTAL SCORE: 0
EVER HAD A DRINK FIRST THING IN THE MORNING TO STEADY YOUR NERVES TO GET RID OF A HANGOVER: NO
HAVE PEOPLE ANNOYED YOU BY CRITICIZING YOUR DRINKING: NO

## 2025-02-02 ASSESSMENT — COLUMBIA-SUICIDE SEVERITY RATING SCALE - C-SSRS
1. IN THE PAST MONTH, HAVE YOU WISHED YOU WERE DEAD OR WISHED YOU COULD GO TO SLEEP AND NOT WAKE UP?: NO
6. HAVE YOU EVER DONE ANYTHING, STARTED TO DO ANYTHING, OR PREPARED TO DO ANYTHING TO END YOUR LIFE?: NO
2. HAVE YOU ACTUALLY HAD ANY THOUGHTS OF KILLING YOURSELF?: NO

## 2025-02-02 ASSESSMENT — PAIN SCALES - GENERAL: PAINLEVEL_OUTOF10: 4

## 2025-02-02 ASSESSMENT — PAIN - FUNCTIONAL ASSESSMENT: PAIN_FUNCTIONAL_ASSESSMENT: 0-10

## 2025-02-02 NOTE — Clinical Note
Rocio Jones was seen and treated in our emergency department on 2/2/2025.  She may return to work on 02/03/2025.  Ms. Jones should not lift anything heavier than 15 pounds until 2/5/2025.     If you have any questions or concerns, please don't hesitate to call.      Trixie Helm MD

## 2025-02-02 NOTE — ED PROVIDER NOTES
Emergency Department Provider Note        History of Present Illness     History provided by: Patient  Limitations to History: None  External Records Reviewed with Brief Summary: Outpatient progress note from 1/27/2025 which showed primary care follow-up visit for knee pain, obesity; outpatient prescription for phentermine for weight loss    HPI:  Rocio Jones is a 29 y.o. female  who presents to emergency department for chest tightness and pain with breathing.  She reports she was not feeling well at work this morning with malaise and fatigue, then started to feel tightness in her chest.  This became significantly worse even after resting and then EMS was called.  Reports her chest tightness has since been constant.    No headache, neck pain, fevers or chills, nausea, abdominal pain, leg swelling.  No history of heart problems or blood clots, no history of malignancy.    Patient did recently start phentermine for weight loss a few days ago.  She did not take it this morning.      Physical Exam   Triage vitals:  T 36.5 °C (97.7 °F)  HR 89  /62  RR 16  O2 100 %      Physical exam:   Triage vitals reviewed.  Constitutional: Well developed adult in no acute distress, non toxic in appearance  Head: Normocephalic, atraumatic  Skin: Intact, dry. No rashes or lesions.  Eyes: Pupils are equal. No conjunctival injection.  Neck: Supple. Trachea is midline.  Pulmonary: Normal work of breathing with no accessory muscle use noted.  Clear to auscultation bilaterally.   Cardiovascular: Normal rate, regular rhythm. No murmurs/gallops/rubs appreciated. 2+ radial pulses bilaterally. Anterior chest wall tenderness to palpation  Abdomen: Soft, nondistended. Nontender to palpation.  Extremities: No gross deformities.  Moving all extremities spontaneously without difficulty.  Neuro: Awake and alert. Face is symmetric. Speech is clear. No obvious focal findings.  Psych: Appropriate mood and affect.      Medical Decision  Making & ED Course   Medical Decision Makin y.o. female presenting to ED with atypical chest pain.  On arrival, patient was afebrile hemodynamically stable, saturating well on room air.  On exam, her chest pain is reproducible with palpation of the anterior chest wall.  No LE edema.    Differential diagnosis includes costochondritis, pneumonia, PE, arrhythmia, anxiety.  EKG shows normal sinus rhythm with no signs of ischemia.  Given patient's overall low risk no troponin testing was indicated at this time.     Low suspicion for ACS (HEAR score 1), acute PE (low risk Well’s and negative D-dimer), pericarditis, myocarditis, thoracic aortic dissection, pneumothorax, pneumonia or other acute infectious etiology. No indication for troponin at this time given low risk HEAR score and atypical presentation. EKG and chest xray were obtained. Chest xray was negative for acute process on my independent interpretation. Patient was given a prescription for Tylenol, ibuprofen, and lidocaine patches.    Patient was discharged home in stable condition. Patient was advised to return if symptoms worsen or don’t improve. Patient was advised to follow up with their PCP as needed.     ----    Differential diagnoses considered include but are not limited to: Costochondritis, pneumonia, PE, ACS, pericarditis, myocarditis, pneumothorax     Social Determinants of Health which Significantly Impact Care: None identified     EKG Independent Interpretation: EKG interpreted by myself. Please see ED Course for full interpretation.    Independent Result Review and Interpretation: Relevant laboratory and radiographic results were reviewed and independently interpreted by myself.  As necessary, they are commented on in the ED Course.    Chronic conditions affecting the patient's care: As documented above in [facility]    The patient was discussed with the following consultants/services: None    Care Considerations: As documented above in [facility]    ED  Course:  ED Course as of 02/02/25 1512   Sun Feb 02, 2025   1242 ECG 12 lead  EKG interpreted by me (ED resident): 96 bpm, normal sinus rhythm with a normal axis.  , QRS 78, QTc 432.  No ST elevations or depressions concerning for ischemia.  There is a T wave inversion in lead V3.  Compared to prior EKG on 8/1/2023, TWI in lead III is now present, no other significant change. []   1404 XR chest 2 views  CXR reviewed by me without evidence of PTX, PNA, or widened mediastinum. [HH]   1405 D-Dimer, Quantitative VTE Exclusion(!): 518  Given PE is not the most likely diagnosis, by YEARS criteria a CT is not indicated at this time []      ED Course User Index  [] Trixie Helm MD         Diagnoses as of 02/02/25 1512   Chest wall pain     Disposition   As a result of the work-up, the patient was discharged home.  she was informed of her diagnosis and instructed to come back with any concerns or worsening of condition.  she and was agreeable to the plan as discussed above.  she was given the opportunity to ask questions.  All of the patient's questions were answered.      Patient seen and discussed with ED attending physician.    Trixie Helm MD  Emergency Medicine     Trixie Helm MD  Resident  02/02/25 1516

## 2025-02-02 NOTE — DISCHARGE INSTRUCTIONS
You were seen in the emergency department for chest tightness and shortness of breath.  Your EKG and chest x-ray showed no abnormal findings and your labs were reassuring against a blood clot.  At home, you can try Tylenol, Motrin, and lidocaine patches to help with the pain, and you should avoid lifting heavy objects for a few days if possible.  You should follow-up with your primary care doctor if your pain does not improve.  Please come back to the emergency department if you have worsening chest pain, difficulty breathing, fever/chills, or any new concerns.

## 2025-02-03 LAB
ATRIAL RATE: 96 BPM
P AXIS: 41 DEGREES
P OFFSET: 171 MS
P ONSET: 124 MS
PR INTERVAL: 194 MS
Q ONSET: 221 MS
QRS COUNT: 16 BEATS
QRS DURATION: 78 MS
QT INTERVAL: 342 MS
QTC CALCULATION(BAZETT): 432 MS
QTC FREDERICIA: 400 MS
R AXIS: 39 DEGREES
T AXIS: 28 DEGREES
T OFFSET: 392 MS
VENTRICULAR RATE: 96 BPM

## 2025-02-14 ENCOUNTER — OFFICE VISIT (OUTPATIENT)
Dept: ORTHOPEDIC SURGERY | Facility: CLINIC | Age: 30
End: 2025-02-14
Payer: COMMERCIAL

## 2025-02-14 VITALS — WEIGHT: 205 LBS | HEIGHT: 63 IN | BODY MASS INDEX: 36.32 KG/M2

## 2025-02-14 DIAGNOSIS — M67.432 GANGLION OF LEFT WRIST: Primary | ICD-10-CM

## 2025-02-14 PROCEDURE — 99214 OFFICE O/P EST MOD 30 MIN: CPT | Performed by: STUDENT IN AN ORGANIZED HEALTH CARE EDUCATION/TRAINING PROGRAM

## 2025-02-14 PROCEDURE — 1036F TOBACCO NON-USER: CPT | Performed by: STUDENT IN AN ORGANIZED HEALTH CARE EDUCATION/TRAINING PROGRAM

## 2025-02-14 PROCEDURE — 3008F BODY MASS INDEX DOCD: CPT | Performed by: STUDENT IN AN ORGANIZED HEALTH CARE EDUCATION/TRAINING PROGRAM

## 2025-02-14 NOTE — PROGRESS NOTES
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  Denies discrete trauma or injury.  Denies a history of rheumatoid arthritis    She explains that she has had a left dorsal wrist soft tissue mass for many years.  She was diagnosed with a dorsal ganglion by a  hand provider approximately 5 years ago.  She underwent an in office needle aspiration which was effective for approximately 4 months then it recurred.  She has been tolerating it well for years however lately it has been very painful especially extremes of flexion and extension.  She denies any associated numbness tingling or paresthesias.  She denies any other soft tissue masses on her body.  She denies any spontaneous drainage or overlying skin changes.  She denies any constitutional symptoms.    Interval Update 2/14/25:  Patient has been compliant with removable brace use.  She denies any improvement in the size of the associated pain of the soft tissue mass.  In fact the brace is an irritant against it.  She denies any new numbness tingling or paresthesias.    PHYSICAL EXAM    Extremities / Musculoskeletal:                  Left wrist:  Obvious grape sized dorsal soft tissue mass overlying the wrist.  No overlying skin changes.  Positive transillumination, nonpulsatile, negative Tinel's.  Focally tender to palpation.  Mass does not move with tendon excursion.  Full active wrist range of motion flexion extension with focal pain at the mass at terminal flexion extension  Full composite digital flexion full strong resisted extension MCP and IP joints.  Motor intact radial ulnar median AIN PIN.  Sensation tact light touch radial and median.   2+ radial warm well-perfused      IMAGING / LABS / EMGs:    Left wrist x-ray series obtained today and independent viewed demonstrating normal global alignment no obvious signs of acute fracture or dislocation.    MRI L Wrist without contrast performed on 1/25/2025:  IMPRESSION:  1. Cystic structure at the dorsal wrist soft tissues, most likely  representing ganglion cyst as above. Otherwise no evidence of  internal derangement.    ASSESSMENT:   Left dorsal wrist soft tissue mass suspected recurrent ganglion    We a long discussion regarding my assessment as well as available treatment options with their associated risk benefits.  We discussed that an issue with needle aspiration is a high risk of recurrence.  We discussed that there is a high risk of recurrence with all treatment methods for this.  For now I would recommend conservatively treating this with a trial of immobilization in a removable wrist brace for 1 month.  This may minimize the size and focal pain related to this mass.  I would also like to obtain an MRI just to understand the radiographic features of the mass and the precise location should we pursue an excisional biopsy.    Interval Update 2/14/25:  A long discussion regarding results of the MRI.  Based on her history clinical examination and the imaging findings is very consistent with a ganglion cyst.  We discussed that without tissue biopsy we will not know with certainty what it is.  The patient is very frustrated due to the chronic pain and appearance of it.  She has failed conservative measures and did not recur despite a prior needle aspiration.  I think would be reasonable to consider an excisional biopsy of the mass.  We discussed that cosmetically she would treat a bump for a scar.  We discussed a major consideration of the surgery is a relatively high rate of recurrence associated with it.  We discussed the risks of this procedure include but are not limited to infection, bleeding,  damage surrounding structures, tendon irritation, tendon rupture, wound healing complications and need for subsequent surgeries.    There are no barriers to understanding.  All questions were answered.  The patient clearly understands clinical scenario.  She would like to proceed with an elective soft tissue mass excision.    PLAN:   OR planning     SURGICAL INDICATION    I reviewed the options for further management of this condition and the likely success rates of each.  The patient feels that they have maximized the benefits of conservative care, and they do want to go on to surgery.    I reviewed the major risks of surgery including infection; scarring; damage to nerves, tendons, or vessels; stiffness; chronic pain, recurrence and wound healing problems, as well as anesthesia risks.  I answered their questions to their satisfaction.  They were given my contact information and will contact the office when they are ready to schedule an exact surgical date.  Surgery will be posted as follows :    Dx :          Left wrist dorsal soft tissue mass   ICD-10 :      M67.432  Procedure :     Left wrist excision of ganglion cyst  CPT :        34401  Anesth :    General + Local   Location :   [facility]. 2/24/25  Duration :    1hr  Specials :    None  PAT :       No  Post-Op Visit :    10-15 days      Follow-up in: 1 month  XR at next visit: none      Chaparro Florentino M.D.    Department of Orthopaedics  Hand/Upper Extremity  Phone: [phone]  Appt. Phone: [phone]

## 2025-02-14 NOTE — H&P (VIEW-ONLY)
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  Denies discrete trauma or injury.  Denies a history of rheumatoid arthritis    She explains that she has had a left dorsal wrist soft tissue mass for many years.  She was diagnosed with a dorsal ganglion by a  hand provider approximately 5 years ago.  She underwent an in office needle aspiration which was effective for approximately 4 months then it recurred.  She has been tolerating it well for years however lately it has been very painful especially extremes of flexion and extension.  She denies any associated numbness tingling or paresthesias.  She denies any other soft tissue masses on her body.  She denies any spontaneous drainage or overlying skin changes.  She denies any constitutional symptoms.    Interval Update 2/14/25:  Patient has been compliant with removable brace use.  She denies any improvement in the size of the associated pain of the soft tissue mass.  In fact the brace is an irritant against it.  She denies any new numbness tingling or paresthesias.    PHYSICAL EXAM    Extremities / Musculoskeletal:                  Left wrist:  Obvious grape sized dorsal soft tissue mass overlying the wrist.  No overlying skin changes.  Positive transillumination, nonpulsatile, negative Tinel's.  Focally tender to palpation.  Mass does not move with tendon excursion.  Full active wrist range of motion flexion extension with focal pain at the mass at terminal flexion extension  Full composite digital flexion full strong resisted extension MCP and IP joints.  Motor intact radial ulnar median AIN PIN.  Sensation tact light touch radial and median.   2+ radial warm well-perfused      IMAGING / LABS / EMGs:    Left wrist x-ray series obtained today and independent viewed demonstrating normal global alignment no obvious signs of acute fracture or dislocation.    MRI L Wrist without contrast performed on 1/25/2025:  IMPRESSION:  1. Cystic structure at the dorsal wrist soft tissues, most likely  representing ganglion cyst as above. Otherwise no evidence of  internal derangement.    ASSESSMENT:   Left dorsal wrist soft tissue mass suspected recurrent ganglion    We a long discussion regarding my assessment as well as available treatment options with their associated risk benefits.  We discussed that an issue with needle aspiration is a high risk of recurrence.  We discussed that there is a high risk of recurrence with all treatment methods for this.  For now I would recommend conservatively treating this with a trial of immobilization in a removable wrist brace for 1 month.  This may minimize the size and focal pain related to this mass.  I would also like to obtain an MRI just to understand the radiographic features of the mass and the precise location should we pursue an excisional biopsy.    Interval Update 2/14/25:  A long discussion regarding results of the MRI.  Based on her history clinical examination and the imaging findings is very consistent with a ganglion cyst.  We discussed that without tissue biopsy we will not know with certainty what it is.  The patient is very frustrated due to the chronic pain and appearance of it.  She has failed conservative measures and did not recur despite a prior needle aspiration.  I think would be reasonable to consider an excisional biopsy of the mass.  We discussed that cosmetically she would treat a bump for a scar.  We discussed a major consideration of the surgery is a relatively high rate of recurrence associated with it.  We discussed the risks of this procedure include but are not limited to infection, bleeding,  damage surrounding structures, tendon irritation, tendon rupture, wound healing complications and need for subsequent surgeries.    There are no barriers to understanding.  All questions were answered.  The patient clearly understands clinical scenario.  She would like to proceed with an elective soft tissue mass excision.    PLAN:   OR planning     SURGICAL INDICATION    I reviewed the options for further management of this condition and the likely success rates of each.  The patient feels that they have maximized the benefits of conservative care, and they do want to go on to surgery.    I reviewed the major risks of surgery including infection; scarring; damage to nerves, tendons, or vessels; stiffness; chronic pain, recurrence and wound healing problems, as well as anesthesia risks.  I answered their questions to their satisfaction.  They were given my contact information and will contact the office when they are ready to schedule an exact surgical date.  Surgery will be posted as follows :    Dx :          Left wrist dorsal soft tissue mass   ICD-10 :      M67.432  Procedure :     Left wrist excision of ganglion cyst  CPT :        77728  Anesth :    General + Local   Location :   [facility]. 2/24/25  Duration :    1hr  Specials :    None  PAT :       No  Post-Op Visit :    10-15 days      Follow-up in: 1 month  XR at next visit: none      Chaparro Florentino M.D.    Department of Orthopaedics  Hand/Upper Extremity  Phone: [phone]  Appt. Phone: [phone]

## 2025-02-18 DIAGNOSIS — M67.432 GANGLION OF LEFT WRIST: ICD-10-CM

## 2025-02-21 ENCOUNTER — ANESTHESIA EVENT (OUTPATIENT)
Dept: OPERATING ROOM | Facility: CLINIC | Age: 30
End: 2025-02-21
Payer: COMMERCIAL

## 2025-02-24 ENCOUNTER — ANESTHESIA (OUTPATIENT)
Dept: OPERATING ROOM | Facility: CLINIC | Age: 30
End: 2025-02-24
Payer: COMMERCIAL

## 2025-02-24 ENCOUNTER — HOSPITAL ENCOUNTER (OUTPATIENT)
Facility: CLINIC | Age: 30
Setting detail: OUTPATIENT SURGERY
Discharge: HOME | End: 2025-02-24
Attending: STUDENT IN AN ORGANIZED HEALTH CARE EDUCATION/TRAINING PROGRAM | Admitting: STUDENT IN AN ORGANIZED HEALTH CARE EDUCATION/TRAINING PROGRAM
Payer: COMMERCIAL

## 2025-02-24 VITALS
DIASTOLIC BLOOD PRESSURE: 75 MMHG | OXYGEN SATURATION: 99 % | HEIGHT: 63 IN | BODY MASS INDEX: 35.2 KG/M2 | RESPIRATION RATE: 16 BRPM | TEMPERATURE: 96.8 F | WEIGHT: 198.63 LBS | SYSTOLIC BLOOD PRESSURE: 109 MMHG | HEART RATE: 86 BPM

## 2025-02-24 DIAGNOSIS — M67.432 GANGLION OF LEFT WRIST: ICD-10-CM

## 2025-02-24 LAB — PREGNANCY TEST URINE, POC: NEGATIVE

## 2025-02-24 PROCEDURE — 2500000004 HC RX 250 GENERAL PHARMACY W/ HCPCS (ALT 636 FOR OP/ED): Mod: SE | Performed by: STUDENT IN AN ORGANIZED HEALTH CARE EDUCATION/TRAINING PROGRAM

## 2025-02-24 PROCEDURE — 25111 REMOVE WRIST TENDON LESION: CPT | Performed by: STUDENT IN AN ORGANIZED HEALTH CARE EDUCATION/TRAINING PROGRAM

## 2025-02-24 PROCEDURE — 88304 TISSUE EXAM BY PATHOLOGIST: CPT | Mod: TC,SUR | Performed by: STUDENT IN AN ORGANIZED HEALTH CARE EDUCATION/TRAINING PROGRAM

## 2025-02-24 PROCEDURE — 3600000007 HC OR TIME - EACH INCREMENTAL 1 MINUTE - PROCEDURE LEVEL TWO: Performed by: STUDENT IN AN ORGANIZED HEALTH CARE EDUCATION/TRAINING PROGRAM

## 2025-02-24 PROCEDURE — 7100000002 HC RECOVERY ROOM TIME - EACH INCREMENTAL 1 MINUTE: Performed by: STUDENT IN AN ORGANIZED HEALTH CARE EDUCATION/TRAINING PROGRAM

## 2025-02-24 PROCEDURE — 7100000001 HC RECOVERY ROOM TIME - INITIAL BASE CHARGE: Performed by: STUDENT IN AN ORGANIZED HEALTH CARE EDUCATION/TRAINING PROGRAM

## 2025-02-24 PROCEDURE — 2720000007 HC OR 272 NO HCPCS: Performed by: STUDENT IN AN ORGANIZED HEALTH CARE EDUCATION/TRAINING PROGRAM

## 2025-02-24 PROCEDURE — 7100000009 HC PHASE TWO TIME - INITIAL BASE CHARGE: Performed by: STUDENT IN AN ORGANIZED HEALTH CARE EDUCATION/TRAINING PROGRAM

## 2025-02-24 PROCEDURE — 2500000005 HC RX 250 GENERAL PHARMACY W/O HCPCS: Mod: SE | Performed by: STUDENT IN AN ORGANIZED HEALTH CARE EDUCATION/TRAINING PROGRAM

## 2025-02-24 PROCEDURE — 7100000010 HC PHASE TWO TIME - EACH INCREMENTAL 1 MINUTE: Performed by: STUDENT IN AN ORGANIZED HEALTH CARE EDUCATION/TRAINING PROGRAM

## 2025-02-24 PROCEDURE — A25111 PR EXCIS PRIMARY GANGLION WRIST: Performed by: ANESTHESIOLOGIST ASSISTANT

## 2025-02-24 PROCEDURE — 3700000001 HC GENERAL ANESTHESIA TIME - INITIAL BASE CHARGE: Performed by: STUDENT IN AN ORGANIZED HEALTH CARE EDUCATION/TRAINING PROGRAM

## 2025-02-24 PROCEDURE — 3600000002 HC OR TIME - INITIAL BASE CHARGE - PROCEDURE LEVEL TWO: Performed by: STUDENT IN AN ORGANIZED HEALTH CARE EDUCATION/TRAINING PROGRAM

## 2025-02-24 PROCEDURE — 2500000004 HC RX 250 GENERAL PHARMACY W/ HCPCS (ALT 636 FOR OP/ED): Mod: SE | Performed by: ANESTHESIOLOGIST ASSISTANT

## 2025-02-24 PROCEDURE — 3700000002 HC GENERAL ANESTHESIA TIME - EACH INCREMENTAL 1 MINUTE: Performed by: STUDENT IN AN ORGANIZED HEALTH CARE EDUCATION/TRAINING PROGRAM

## 2025-02-24 PROCEDURE — A25111 PR EXCIS PRIMARY GANGLION WRIST: Performed by: ANESTHESIOLOGY

## 2025-02-24 RX ORDER — FENTANYL CITRATE 50 UG/ML
50 INJECTION, SOLUTION INTRAMUSCULAR; INTRAVENOUS EVERY 5 MIN PRN
Status: DISCONTINUED | OUTPATIENT
Start: 2025-02-24 | End: 2025-02-24 | Stop reason: HOSPADM

## 2025-02-24 RX ORDER — OXYCODONE HYDROCHLORIDE 5 MG/1
5 TABLET ORAL EVERY 6 HOURS PRN
Qty: 6 TABLET | Refills: 0 | Status: SHIPPED | OUTPATIENT
Start: 2025-02-24

## 2025-02-24 RX ORDER — MIDAZOLAM HYDROCHLORIDE 1 MG/ML
INJECTION, SOLUTION INTRAMUSCULAR; INTRAVENOUS AS NEEDED
Status: DISCONTINUED | OUTPATIENT
Start: 2025-02-24 | End: 2025-02-24

## 2025-02-24 RX ORDER — ACETAMINOPHEN 500 MG
500 TABLET ORAL EVERY 6 HOURS PRN
Qty: 112 TABLET | Refills: 0 | Status: SHIPPED | OUTPATIENT
Start: 2025-02-24 | End: 2025-03-24

## 2025-02-24 RX ORDER — ALBUTEROL SULFATE 0.83 MG/ML
2.5 SOLUTION RESPIRATORY (INHALATION) ONCE AS NEEDED
Status: DISCONTINUED | OUTPATIENT
Start: 2025-02-24 | End: 2025-02-24 | Stop reason: HOSPADM

## 2025-02-24 RX ORDER — FENTANYL CITRATE 50 UG/ML
INJECTION, SOLUTION INTRAMUSCULAR; INTRAVENOUS AS NEEDED
Status: DISCONTINUED | OUTPATIENT
Start: 2025-02-24 | End: 2025-02-24

## 2025-02-24 RX ORDER — FENTANYL CITRATE 50 UG/ML
25 INJECTION, SOLUTION INTRAMUSCULAR; INTRAVENOUS EVERY 5 MIN PRN
Status: DISCONTINUED | OUTPATIENT
Start: 2025-02-24 | End: 2025-02-24 | Stop reason: HOSPADM

## 2025-02-24 RX ORDER — ACETAMINOPHEN 325 MG/1
650 TABLET ORAL EVERY 4 HOURS PRN
Status: DISCONTINUED | OUTPATIENT
Start: 2025-02-24 | End: 2025-02-24 | Stop reason: HOSPADM

## 2025-02-24 RX ORDER — LIDOCAINE HYDROCHLORIDE 10 MG/ML
0.1 INJECTION, SOLUTION EPIDURAL; INFILTRATION; INTRACAUDAL; PERINEURAL ONCE
Status: DISCONTINUED | OUTPATIENT
Start: 2025-02-24 | End: 2025-02-24 | Stop reason: HOSPADM

## 2025-02-24 RX ORDER — PROPOFOL 10 MG/ML
INJECTION, EMULSION INTRAVENOUS AS NEEDED
Status: DISCONTINUED | OUTPATIENT
Start: 2025-02-24 | End: 2025-02-24

## 2025-02-24 RX ORDER — OXYCODONE HYDROCHLORIDE 5 MG/1
5 TABLET ORAL EVERY 4 HOURS PRN
Status: DISCONTINUED | OUTPATIENT
Start: 2025-02-24 | End: 2025-02-24 | Stop reason: HOSPADM

## 2025-02-24 RX ORDER — SODIUM CHLORIDE 0.9 G/100ML
INJECTION, SOLUTION IRRIGATION AS NEEDED
Status: DISCONTINUED | OUTPATIENT
Start: 2025-02-24 | End: 2025-02-24 | Stop reason: HOSPADM

## 2025-02-24 RX ORDER — LABETALOL HYDROCHLORIDE 5 MG/ML
5 INJECTION, SOLUTION INTRAVENOUS ONCE AS NEEDED
Status: DISCONTINUED | OUTPATIENT
Start: 2025-02-24 | End: 2025-02-24 | Stop reason: HOSPADM

## 2025-02-24 RX ORDER — LIDOCAINE HYDROCHLORIDE 10 MG/ML
INJECTION, SOLUTION INFILTRATION; PERINEURAL AS NEEDED
Status: DISCONTINUED | OUTPATIENT
Start: 2025-02-24 | End: 2025-02-24 | Stop reason: HOSPADM

## 2025-02-24 RX ORDER — SODIUM CHLORIDE, SODIUM LACTATE, POTASSIUM CHLORIDE, CALCIUM CHLORIDE 600; 310; 30; 20 MG/100ML; MG/100ML; MG/100ML; MG/100ML
100 INJECTION, SOLUTION INTRAVENOUS CONTINUOUS
Status: DISCONTINUED | OUTPATIENT
Start: 2025-02-24 | End: 2025-02-24 | Stop reason: HOSPADM

## 2025-02-24 RX ORDER — CEFAZOLIN 1 G/1
INJECTION, POWDER, FOR SOLUTION INTRAVENOUS AS NEEDED
Status: DISCONTINUED | OUTPATIENT
Start: 2025-02-24 | End: 2025-02-24

## 2025-02-24 RX ORDER — KETOROLAC TROMETHAMINE 30 MG/ML
INJECTION, SOLUTION INTRAMUSCULAR; INTRAVENOUS AS NEEDED
Status: DISCONTINUED | OUTPATIENT
Start: 2025-02-24 | End: 2025-02-24

## 2025-02-24 RX ORDER — ONDANSETRON HYDROCHLORIDE 2 MG/ML
4 INJECTION, SOLUTION INTRAVENOUS ONCE AS NEEDED
Status: DISCONTINUED | OUTPATIENT
Start: 2025-02-24 | End: 2025-02-24 | Stop reason: HOSPADM

## 2025-02-24 RX ORDER — LIDOCAINE HYDROCHLORIDE 20 MG/ML
INJECTION, SOLUTION INFILTRATION; PERINEURAL AS NEEDED
Status: DISCONTINUED | OUTPATIENT
Start: 2025-02-24 | End: 2025-02-24

## 2025-02-24 RX ORDER — DROPERIDOL 2.5 MG/ML
0.62 INJECTION, SOLUTION INTRAMUSCULAR; INTRAVENOUS ONCE AS NEEDED
Status: DISCONTINUED | OUTPATIENT
Start: 2025-02-24 | End: 2025-02-24 | Stop reason: HOSPADM

## 2025-02-24 RX ORDER — BUPIVACAINE HYDROCHLORIDE 5 MG/ML
INJECTION, SOLUTION EPIDURAL; INTRACAUDAL AS NEEDED
Status: DISCONTINUED | OUTPATIENT
Start: 2025-02-24 | End: 2025-02-24 | Stop reason: HOSPADM

## 2025-02-24 RX ORDER — ONDANSETRON HYDROCHLORIDE 2 MG/ML
INJECTION, SOLUTION INTRAVENOUS AS NEEDED
Status: DISCONTINUED | OUTPATIENT
Start: 2025-02-24 | End: 2025-02-24

## 2025-02-24 RX ADMIN — PROPOFOL 200 MG: 10 INJECTION, EMULSION INTRAVENOUS at 07:34

## 2025-02-24 RX ADMIN — CEFAZOLIN 2 G: 1 INJECTION, POWDER, FOR SOLUTION INTRAMUSCULAR; INTRAVENOUS at 07:38

## 2025-02-24 RX ADMIN — SODIUM CHLORIDE, POTASSIUM CHLORIDE, SODIUM LACTATE AND CALCIUM CHLORIDE: 600; 310; 30; 20 INJECTION, SOLUTION INTRAVENOUS at 07:28

## 2025-02-24 RX ADMIN — FENTANYL CITRATE 50 MCG: 0.05 INJECTION, SOLUTION INTRAMUSCULAR; INTRAVENOUS at 07:34

## 2025-02-24 RX ADMIN — ONDANSETRON 4 MG: 2 INJECTION INTRAMUSCULAR; INTRAVENOUS at 08:13

## 2025-02-24 RX ADMIN — FENTANYL CITRATE 25 MCG: 0.05 INJECTION, SOLUTION INTRAMUSCULAR; INTRAVENOUS at 08:07

## 2025-02-24 RX ADMIN — MIDAZOLAM 2 MG: 1 INJECTION INTRAMUSCULAR; INTRAVENOUS at 07:31

## 2025-02-24 RX ADMIN — KETOROLAC TROMETHAMINE 30 MG: 30 INJECTION, SOLUTION INTRAMUSCULAR; INTRAVENOUS at 07:55

## 2025-02-24 RX ADMIN — DEXAMETHASONE SODIUM PHOSPHATE 4 MG: 4 INJECTION INTRA-ARTICULAR; INTRALESIONAL; INTRAMUSCULAR; INTRAVENOUS; SOFT TISSUE at 07:38

## 2025-02-24 RX ADMIN — LIDOCAINE HYDROCHLORIDE 80 MG: 20 INJECTION, SOLUTION INFILTRATION; PERINEURAL at 07:34

## 2025-02-24 RX ADMIN — FENTANYL CITRATE 25 MCG: 0.05 INJECTION, SOLUTION INTRAMUSCULAR; INTRAVENOUS at 07:55

## 2025-02-24 ASSESSMENT — PAIN SCALES - GENERAL
PAINLEVEL_OUTOF10: 0 - NO PAIN

## 2025-02-24 ASSESSMENT — PAIN - FUNCTIONAL ASSESSMENT
PAIN_FUNCTIONAL_ASSESSMENT: 0-10

## 2025-02-24 NOTE — ANESTHESIA POSTPROCEDURE EVALUATION
Patient: Rocio Jones    Procedure Summary       Date: 02/24/25 Room / Location: [facility] OR 03 / [facility]    Anesthesia Start: 0728 Anesthesia Stop: 0835    Procedure: Left wrist excision of ganglion cyst / 60 Minutes (Left: Wrist) Diagnosis:       Ganglion of left wrist      (Ganglion of left wrist [M67.432])    Surgeons: Chaparro Florentino MD Responsible Provider: Ana Maria Camarillo DO    Anesthesia Type: general ASA Status: 3            Anesthesia Type: general    Vitals Value Taken Time   /76 02/24/25 0900   Temp 36 °C (96.8 °F) 02/24/25 0833   Pulse 85 02/24/25 0900   Resp 15 02/24/25 0900   SpO2 98 % 02/24/25 0900       Anesthesia Post Evaluation    Patient location during evaluation: PACU  Patient participation: complete - patient participated  Level of consciousness: awake  Pain management: satisfactory to patient  Multimodal analgesia pain management approach  Airway patency: patent  Cardiovascular status: acceptable  Respiratory status: acceptable  Hydration status: acceptable  Postoperative Nausea and Vomiting: none    No notable events documented.

## 2025-02-24 NOTE — INTERVAL H&P NOTE
H&P reviewed. The patient was examined and there are no changes to the H&P.    ROS negative except for focal pain about the left dorsal wrist mass.   Denies CP/SOB, N/V,     Exam:  Aforementioned soft tissue dorsal wrist mass. Focally TTP, non-pulsatile, hand clearly warm and well perfused. Motor intact to R/U/M/AIN/PIN, SILT R/U/M. 2+ radial, WWP    No current facility-administered medications on file prior to encounter.     Current Outpatient Medications on File Prior to Encounter   Medication Sig Dispense Refill    lidocaine (Lidoderm) 5 % patch Place 1 patch over 12 hours on the skin once daily. Remove & discard patch within 12 hours or as directed by MD. 5 patch 0    phentermine 37.5 mg capsule Take 1 capsule (37.5 mg) by mouth once daily in the morning. Take before meals. 30 capsule 2    acetaminophen (TylenoL) 325 mg tablet Take 3 tablets (975 mg) by mouth every 6 hours if needed for mild pain (1 - 3) or fever (temp greater than 38.0 C). 30 tablet 0    buPROPion XL (Wellbutrin XL) 150 mg 24 hr tablet Take 1 tablet (150 mg) by mouth once daily in the morning. Do not crush, chew, or split. 90 tablet 1    ibuprofen 600 mg tablet Take 1 tablet (600 mg) by mouth every 6 hours if needed for mild pain (1 - 3). 30 tablet 0    topiramate (Topamax) 100 mg tablet Take 1 tablet (100 mg) by mouth once daily. 90 tablet 1     Plan: OR today for excision of left wrist soft tissue mass

## 2025-02-24 NOTE — ANESTHESIA PREPROCEDURE EVALUATION
Patient: Rocio Jones    Procedure Information       Date/Time: 02/24/25 0730    Procedure: Left wrist excision of ganglion cyst / 60 Minutes (Left: Wrist) - General + Local    [place] [facility] [place] [facility] / [facility] [place] [facility]    Surgeons: Chaparro Florentino MD            Relevant Problems   Anesthesia (within normal limits)      Cardiac (within normal limits)      Pulmonary (within normal limits)      Neuro (within normal limits)      GI (within normal limits)      /Renal (within normal limits)      Liver (within normal limits)      Hematology   (+) Anemia affecting pregnancy (HHS-HCC)      ID   (+) Candidiasis      GYN   (+) Abnormal uterine bleeding   (+) PCOS (polycystic ovarian syndrome)   There were no vitals filed for this visit.    Past Surgical History:   Procedure Laterality Date   • BRONCHOSCOPY     • OTHER SURGICAL HISTORY  10/28/2021    Breast reduction   • SALPINGECTOMY  01/04/2024     Past Medical History:   Diagnosis Date   • BV (bacterial vaginosis) 11/05/2023   • Dysuria 11/05/2023   • Hematuria 11/05/2023   • Polycystic ovarian disease    • Pulmonary nodule, right     RLL with mediastinal lymphadenopathy   • Request for sterilization 11/29/2023   • Routine screening for STI (sexually transmitted infection) 10/03/2024   • Sickle cell trait (CMS-HCC)    • Vaginal discharge 11/05/2023       Current Facility-Administered Medications:   •  lidocaine PF (Xylocaine) 10 mg/mL (1 %) injection 1 mg, 0.1 mL, subcutaneous, Once, Anh Perez,   Prior to Admission medications    Medication Sig Start Date End Date Taking? Authorizing Provider   lidocaine (Lidoderm) 5 % patch Place 1 patch over 12 hours on the skin once daily. Remove & discard patch within 12 hours or as directed by MD. 2/2/25  Yes Trixie Helm MD   phentermine 37.5 mg capsule Take 1 capsule (37.5 mg) by mouth once daily in the morning. Take before meals. 1/27/25 4/27/25 Yes Saurabh Arroyo MD   acetaminophen (TylenoL) 325 mg tablet  "Take 3 tablets (975 mg) by mouth every 6 hours if needed for mild pain (1 - 3) or fever (temp greater than 38.0 C). 2/2/25   Trixie Helm MD   buPROPion XL (Wellbutrin XL) 150 mg 24 hr tablet Take 1 tablet (150 mg) by mouth once daily in the morning. Do not crush, chew, or split. 7/23/24 1/19/25  Saurabh Arroyo MD   ibuprofen 600 mg tablet Take 1 tablet (600 mg) by mouth every 6 hours if needed for mild pain (1 - 3). 2/2/25   Trixie Helm MD   topiramate (Topamax) 100 mg tablet Take 1 tablet (100 mg) by mouth once daily. 7/23/24 1/19/25  Saurabh Arroyo MD     No Known Allergies  Social History     Tobacco Use   • Smoking status: Never     Passive exposure: Never   • Smokeless tobacco: Never   • Tobacco comments:     Pt denies any illness in past month     Denies personal/family reaction or problems with anesthesia     Slight SOB with stairs and activity     Ambulates freely   Substance Use Topics   • Alcohol use: Not Currently         Chemistry    Lab Results   Component Value Date/Time     07/26/2023 1619    K 3.6 07/26/2023 1619     07/26/2023 1619    CO2 26 07/26/2023 1619    BUN 8 07/26/2023 1619    CREATININE 0.83 07/26/2023 1619    Lab Results   Component Value Date/Time    CALCIUM 9.4 07/26/2023 1619    ALKPHOS 134 (H) 12/07/2022 1348    AST 12 12/07/2022 1348    ALT 9 12/07/2022 1348    BILITOT 0.6 12/07/2022 1348          Lab Results   Component Value Date/Time    WBC 5.3 11/19/2024 0917    HGB 13.2 11/19/2024 0917    HCT 40.2 11/19/2024 0917     11/19/2024 0917     No results found for: \"PROTIME\", \"PTT\", \"INR\"  Encounter Date: 02/02/25   ECG 12 lead   Result Value    Ventricular Rate 96    Atrial Rate 96    NE Interval 194    QRS Duration 78    QT Interval 342    QTC Calculation(Bazett) 432    P Axis 41    R Axis 39    T Axis 28    QRS Count 16    Q Onset 221    P Onset 124    P Offset 171    T Offset 392    QTC Fredericia 400    Narrative    Normal sinus rhythm  Nonspecific T wave " abnormality  Abnormal ECG  When compared with ECG of 01-AUG-2023 10:18,  No significant change was found  See ED provider note for full interpretation and clinical correlation  Confirmed by Trixie Lopez ([id number]) on 2/3/2025 3:08:59 AM     No results found for this or any previous visit from the past 1095 days.      Clinical information reviewed:   Tobacco  Allergies  Meds   Med Hx  Surg Hx   Fam Hx  Soc Hx        NPO Detail:  NPO/Void Status  Date of Last Liquid: 02/23/25  Time of Last Liquid: 2100  Date of Last Solid: 02/23/25  Time of Last Solid: 2100  Last Intake Type: Clear fluids  Time of Last Void: 0654         Physical Exam    Airway  Mallampati: III  TM distance: >3 FB  Neck ROM: full     Cardiovascular    Dental - normal exam     Pulmonary    Abdominal        Anesthesia Plan    History of general anesthesia?: yes  History of complications of general anesthesia?: no    ASA 3     general     intravenous induction   Anesthetic plan and risks discussed with patient.    Plan discussed with CAA and attending.

## 2025-02-24 NOTE — DISCHARGE INSTRUCTIONS
Post-Operative Instructions  Dr. Chaparro Florentino [phone]   [phone]    Dressing:  You have a bandage or splint covering your operative site.    Do not remove the dressing until your next scheduled appointment with your surgeon or therapist. Keep your dressing clean and dry. The dressing will be removed at that appointment.     Post Anesthesia Instructions:  If you received general anesthesia or IV sedation for your procedure for the next 24 hours: No driving, no drinking alcohol, no sleeping aids, no important decision making, and have an adult with you for 24 hours.    Showering:  You may shower following surgery but please adhere to above instructions regarding the dressing. If showering with bandage/splint in place please ensure that it is kept dry and covered while bathing. There are commercially available cast bags that can be used to protect your dressing while showering. If using garbage bags please make sure that there are no holes in the bag and that the bag has been sealed above the dressing. If the bandage gets wet you must contact your surgeon's office to make arrangements to be seen to have the bandage changed.     Ice/Elevation:  The application of ice to your surgical site after surgery will help with pain control and swelling. This can be very effective for 48-72 hours after surgery. Please be careful to avoid getting bandage wet from a leaky ice bag. Please be advised that the ice may need to be applied for longer periods of time for the cooling effect to penetrate the post-operative dressing.     Elevation of the operative site above the level of the heart as much as possible for the first 48-72 hours after surgery. Use your sling if you have been provided one while standing or walking. If your fingers are not included in the dressing you are encouraged to attempt finger range of motion as this will help with your hand swelling and ultimate recovery.    Pain  Subjective:    Chief complaint:    Denies issues  Wife is by the bedside    Objective:    /63   Pulse 98   Temp 98.2 °F (36.8 °C) (Temporal)   Resp 26   Ht 6' 2\" (1.88 m)   Wt 185 lb (83.9 kg)   SpO2 93%   BMI 23.75 kg/m²   General : Awake, more alert  Heart:  RRR, no murmurs, gallops, or rubs. Lungs:  CTA bilaterally, no wheeze, rales or rhonchi  Abd: bowel sounds present, nontender, nondistended, no masses  Extrem:  No clubbing, cyanosis, or edema    CBC:   Lab Results   Component Value Date    WBC 21.8 12/26/2020    RBC 4.57 12/26/2020    HGB 13.4 12/26/2020    HCT 42.9 12/26/2020    MCV 93.9 12/26/2020    MCH 29.3 12/26/2020    MCHC 31.2 12/26/2020    RDW 14.2 12/26/2020     12/26/2020    MPV 13.7 12/26/2020     BMP:    Lab Results   Component Value Date     12/26/2020    K 3.9 12/26/2020     12/26/2020    CO2 22 12/26/2020     12/26/2020    LABALBU 2.6 12/26/2020    LABALBU 4.8 04/27/2012    CREATININE 2.4 12/26/2020    CALCIUM 9.0 12/26/2020    GFRAA 33 12/26/2020    LABGLOM 27 12/26/2020    GLUCOSE 112 12/26/2020    GLUCOSE 112 04/27/2012     PT/INR:    Lab Results   Component Value Date    PROTIME 14.6 12/22/2020    INR 1.3 12/22/2020     Troponin:    Lab Results   Component Value Date    TROPONINI 0.10 12/22/2020       No results for input(s): LABURIN in the last 72 hours. No results for input(s): BC in the last 72 hours. No results for input(s): Gill Caballero in the last 72 hours.       Current Facility-Administered Medications:     lidocaine 1 % injection 5 mL, 5 mL, Intradermal, Once, Adebayo Schuster MD    sodium chloride flush 0.9 % injection 10 mL, 10 mL, Intravenous, PRN, Adebayo Schuster MD    heparin flush 100 UNIT/ML injection 100 Units, 1 mL, Intravenous, 2 times per day, Adebayo Schuster MD    heparin flush 100 UNIT/ML injection 100 Units, 1 mL, Intracatheter, PRN, Adebayo Schuster MD Medication:  If you received a regional anesthetic on the day of your surgery your arm and hand may be numb for up to 24 hours after surgery. It is important to wear your sling while the block is still effective in order to protect your arm. It is advisable to take pain medications prior to going to sleep in case the regional anesthesia medication wears off while you are sleeping.    Take your pain medications as directed. Narcotic pain medications can cause lethargy, nausea and constipation. Please contact your surgeon's office and discontinue the medication if these symptoms become problematic. Eating a regular diet, drinking fluids and walking can help with constipation from these medications. Avoid alcohol consumption and driving while taking narcotic pain medications.     Additional pain control options:     You are encouraged to take over the counter medications like Advil / Motrin / Ibuprofen / Aleve in addition to your prescribed pain medications after surgery.    Smoking/Tobacco:  Tobacco use is known to interfere with wound and fracture healing and increase post-operative pain. It can also increase your risk of poor outcomes following surgery. Please do not use tobacco or nicotine products following your surgery. This includes smokeless tobacco, or nicotine replacement products (patches, gum, etc.).    Driving:  It is not advisable to operate a vehicle while using narcotic pain medications. Additionally, please be advised that you are likely to have challenges operating a car or motorcycle while you are still in your postoperative dressing and this could increase your risk of being involved in an accident and being cited for driving while physically impaired.     Warning Signs:  Observe your arm/hand and incision site (if visible) for increased redness, inflammation, drainage, odor or pain that is unrelieved by rest, elevation or medication. Please contact your surgeon's office immediately if you develop    meropenem (MERREM) 500 mg IVPB extended (mini-bag), 500 mg, Intravenous, Q24H, Stopped at 12/26/20 2039 **AND** 0.9 % sodium chloride infusion (meropenem flush), , Intravenous, Q24H, Cristobal Prince MD, Stopped at 12/26/20 2039    morphine (PF) injection 1 mg, 1 mg, Intravenous, Q2H PRN, Rita Julian DO, 1 mg at 12/25/20 1217    dextrose 5 % solution, , Intravenous, Continuous, Salanika Lina, DO, Last Rate: 125 mL/hr at 12/27/20 0041, New Bag at 12/27/20 0041    heparin flush 100 UNIT/ML injection 100 Units, 100 Units, Intracatheter, PRN, Familia Nielsen MD, 100 Units at 12/26/20 2037    mineral oil-hydrophilic petrolatum (HYDROPHOR) ointment, , Topical, BID, Given at 12/27/20 1238 **AND** mineral oil-hydrophilic petrolatum (HYDROPHOR) ointment, , Topical, TID PRN, Familia Nielsen MD    sodium chloride flush 0.9 % injection 10 mL, 10 mL, Intravenous, 2 times per day, Familia Nielsen MD, 10 mL at 12/26/20 2037    sodium chloride flush 0.9 % injection 10 mL, 10 mL, Intravenous, PRN, Familia Nielsen MD    heparin (porcine) injection 5,000 Units, 5,000 Units, Subcutaneous, 3 times per day, Familia Nielsen MD, 5,000 Units at 12/27/20 4215    levothyroxine (SYNTHROID) tablet 50 mcg, 50 mcg, Oral, Daily, Familia Nielsen MD, 50 mcg at 12/26/20 1406    aspirin chewable tablet 81 mg, 81 mg, Oral, Daily, Familia Nielsen MD, 81 mg at 12/26/20 1038    bisacodyl (DULCOLAX) suppository 10 mg, 10 mg, Rectal, Daily PRN, Familia Nielsen MD    magnesium hydroxide (MILK OF MAGNESIA) 400 MG/5ML suspension 30 mL, 30 mL, Oral, Daily PRN, Familia Nielsen MD    traMADol (ULTRAM) tablet 50 mg, 50 mg, Oral, Q4H PRN, Familia Nielsen MD    sertraline (ZOLOFT) tablet 50 mg, 50 mg, Oral, Daily, Familia Nielsen MD, 50 mg at 12/26/20 1038 Resident any of these issues or if you develop a fever greater than 101°.    Follow Up Appointments:  Your post-operative appointment has been scheduled for 3/12/2025.    [facility], [address], [place], [place], [address]       acetaminophen (TYLENOL) tablet 650 mg, 650 mg, Oral, Q6H PRN **OR** acetaminophen (TYLENOL) suppository 650 mg, 650 mg, Rectal, Q6H PRN, Jonathon Yi MD    Vitamin D (CHOLECALCIFEROL) tablet 2,000 Units, 2,000 Units, Oral, Daily, Jonathon Yi MD, 2,000 Units at 12/26/20 1038    DIET CARDIAC; Dietary Nutrition Supplements: Diabetic Oral Supplement  Dietary Nutrition Supplements: Other Oral Supplement (see comment)    XR CHEST PORTABLE   Final Result   Left lower lobe pneumonia. CT Head WO Contrast   Final Result   No new abnormal findings. Encephalomalacia on the right as before. CT ABDOMEN PELVIS WO CONTRAST Additional Contrast? None   Final Result   No evidence of urinary tract obstruction or other demonstrable specific cause   of renal failure. See above. CT CHEST WO CONTRAST   Final Result   Left lower lobe pneumonia. Opacity at the right lower lobe is likely   atelectasis. Assessment:    Active Problems:    COVID-19    Altered mental status    Palliative care by specialist    Goals of care, counseling/discussion  Resolved Problems:    * No resolved hospital problems. *  Recent stroke  Underlying aphasia  Acute kidney injury  Hypernatremia  Transaminitis      Plan:    He is on high flow oxygen  Creatinine is 2.4  Hemoglobin is 13.4  leukocytosis slowly improving  Blood cultures negative to date    Per nursing patient has not been eating anything  Check swallow eval  Try Marinol for appetite stimulation  Patient is sure he is not going back to subacute  He is trying to decide whether he is going home with home care or hospice    Bobbi Tejeda  1:31 PM  12/27/2020    NOTE: This report was transcribed using voice recognition software.  Every effort was made to ensure accuracy; however, inadvertent transcription errors may be present

## 2025-02-24 NOTE — LETTER
February 24, 2025     Patient: Rocio Jones   YOB: 1995   Date of Visit: 2/18/2025       To Whom It May Concern:    Rocio Jones had surgery on 2/24/25 . Please excuse Rocio for her absence from work on this day to make the appointment.    She will not be able to use her left hand to pull/push/pull anything greater than 5lbs. She must keep her dressing on clean and dry until her follow-up in 2wks.     If you have any questions or concerns, please don't hesitate to call.         Sincerely,         No name on file        CC: No Recipients

## 2025-02-24 NOTE — ANESTHESIA PROCEDURE NOTES
Airway  Date/Time: 2/24/2025 7:35 AM  Urgency: elective    Airway not difficult    Staffing  Performed: CHUN   Authorized by: Ana Maria Camarillo DO    Performed by: JOSELUIS Tripp  Patient location during procedure: OR    Indications and Patient Condition  Indications for airway management: airway protection and anesthesia  Spontaneous ventilation: present  Sedation level: deep  Preoxygenated: yes  Patient position: sniffing  Mask difficulty assessment: 0 - not attempted  Planned trial extubation    Final Airway Details  Final airway type: supraglottic airway      Successful airway: Supraglottic airway: igel.  Size 3     Number of attempts at approach: 1  Ventilation between attempts: none  Number of other approaches attempted: 0

## 2025-02-24 NOTE — OP NOTE
ORTHOPEDIC OPERATIVE NOTE    Name:     Rocio Jones  :     1995  Facility:    [facility]  Date of Surgery:   2025     PREOP DX:     Left dorsal wrist soft tissue mass    POSTOP DX:   Left dorsal wrist soft tissue mass    PROCEDURE:  Excision of left dorsal wrist, presumed ganglion (CPT 81832)    IMPLANTS:   None    SURGEON: Chaparro Florentino M.D.    RESIDENT/FELLOW/ASSISTANT:  None    ANESTHESIA:    General + local    ESTIMATED BLOOD LOSS :   2 ml    TOTAL FLUIDS:     250 ml    SPECIMEN:   Yes, left wrist dorsal soft tissue mass     TOURNIQUET TIME:  37 Minutes at 250 mmHg    COMPLICATIONS:  None    PATIENT RETURNED TO/CONDITION:  PACU in Good    INDICATIONS:      Rocio Jones is a 29 y.o. female who presented to clinic with a chronic, persistent, painful, left wrist dorsal soft tissue mass consistent with a ganglion cyst.  This patient has failed conservative measures including prior needle aspiration by another provider as well as a trial of immobilization.  I obtained an MRI of the left wrist which again was consistent with a simple ganglion cyst.  Due to the persistent appearance of focal pain patient requested to have it removed.  I offered surgery to perform an excision and sending the excised mass for anatomic pathology.  We discussed that the risks of this procedure include but are not limited to, infection, bleeding, damage surrounding structures, recurrence, chronic pain, chronic stiffness, tendon irritation, tendon rupture, wound healing complications, need for subsequent surgeries.  The patient clearly understands the clinical scenario.  All questions were answered, there are no barriers to understanding.  I personally obtained surgical consent.  I personally marked the surgical site.    DESCRIPTION OF PROCEDURE:  The patient was brought to the operating room.     A Safety Huddle was performed with all members of the nursing, anesthesia, and operative team. The patient  was correctly identified using multiple unique patient identifiers, the correct laterality, the correct surgical site, and the correct listed procedure on the consent were confirmed.     The patient remained supine on the stretcher, all bony prominences were well-padded.  The operative extremity was outstretched on a hand table.  A very well-padded brachial pneumatic tourniquet was applied to the operative extremity.  IV cefazolin was administered for surgical antibiotic prophylaxis.  The anesthesia team administered general anesthesia with an LMA without complication.    The operative extremity was prepped using ChloraPrep stick solution.  The extremity was draped in usual sterile fashion.    A Pre-Procedure safety timeout was performed with all members of the nursing, anesthesia, and operative team. The patient was correctly identified using multiple unique patient identifiers, the correct laterality, the correct surgical site, and the correct listed procedure on the consent were confirmed.     The extremity was exsanguinated with an Esmarch bandage to take great care not to rupture the ganglion cyst.  The tourniquet was inflated to 250 mmHg.    We next planned our incision which was a 3 cm in length transverse linear incision directly overlying the prominence of the soft tissue mass.  This was sharply incised using a 15 blade.  Meticulous hemostasis of the subcutaneous small veins was achieved using bipolar electrocautery.  Using dissecting scissors and a hemostat the deeper soft tissue was gently dissected.  This readily revealed ganglion appearing dorsal wrist soft tissue mass.  This was gently dissected circumferentially.  The contents of the fourth dorsal extensor compartment were identified ulnar to the soft tissue mass and the contents of the second dorsal extensor compartment were identified radial to the soft tissue mass.  The mass was dissected deep revealing its stalk which appeared to be emanating from  the intercarpal joint space, at the location of the lunocapitate articulation.  The soft tissue mass was transected just dorsal to the dorsal wrist ligaments.  The specimen was then sent for anatomic pathology.  Taking great care not to injure the extrinsic or intrinsic wrist ligaments the area where the stalk was emanating following was gently bipolared.  A second inspection was made to determine that we completely removed the cyst.  The fourth dorsal extensor compartment was examined and identified all tendons to be intact without injury.  The contents of the second dorsal extensor compartment were examined and determined to be intact and free of injury.  The wrist and hand were clinically examined and all of the tendons had a normal resting cascade.    The surgical site was irrigated with sterile saline.  The deep dermis was closed in an interrupted inverted fashion #4-0 Vicryl suture.  The skin layer was closed with #4-0 Monocryl in a running subcuticular fashion with exposed tails.  Skin glue was placed on the incision.  Steri-Strips were placed perpendicular to the incision.     A total of 10 cc consisting of equal parts 1% lidocaine without epinephrine and 0.5% Marcaine without epinephrine were then injected around the surgical site.    The wound was then dressed with sterile gauze and a very well-padded simple volar resting plaster splint with the forearm and wrist in neutral was applied.    The tourniquet was released after total time of 37 units.  All fingers are very clearly warm and well-perfused with excellent turgor and brisk capillary refill.    All surgical counts were correct at conclusion of the case.    The patient woke from anesthesia without complication.  Patient was transferred to recovery room without complication.    POST-OPERATIVE PLAN:  Patient will have a lifting restriction of 5 pounds to the left side.  Encourage maximal elevation and digital motion.  She must keep the splint on clean  and dry for 2 weeks postoperatively.  She was written for extra-strength Tylenol and oxycodone to be taken as needed.  She will follow-up in roughly 2 weeks for splint removal and wound check.  At that time we will discuss results of her pathology.          Electronically signed  Chaparro Florentino MD  [phone]

## 2025-02-24 NOTE — BRIEF OP NOTE
Date: 2025  OR Location: [facility] OR    Name: Rocio Jones : 1995, Age: 29 y.o., MRN: [id number], Sex: female    Diagnosis  Pre-op Diagnosis      * Ganglion of left wrist [M67.432] Post-op Diagnosis     * Ganglion of left wrist [M67.432]     Procedures  Left wrist excision of ganglion cyst / 60 Minutes  47467 - ND EXCISION GANGLION WRIST DORSAL/VOLAR PRIMARY      Surgeons      * Chaparro Florentino - Primary    Resident/Fellow/Other Assistant:  Surgeons and Role:  * No surgeons found with a matching role *    Staff:   Adoreulator: Blanca Guthrie Person: Amy    Anesthesia Staff: Anesthesiologist: Ana Maria Camarillo DO  C-AA: JOSELUIS Tripp    Procedure Summary  Anesthesia: General  ASA: III  Estimated Blood Loss: <5mL  Intra-op Medications:   Administrations occurring from 0730 to 0830 on 25:   Medication Name Total Dose   sodium chloride 0.9 % irrigation solution 200 mL   bupivacaine PF (Marcaine) 0.5 % (5 mg/mL) injection 5 mL   lidocaine (Xylocaine) 10 mg/mL (1 %) injection 5 mL   ceFAZolin (Ancef) vial 1 g 2 g   dexAMETHasone (Decadron) 4 mg/mL 4 mg   fentaNYL PF 0.05 mg/mL 100 mcg   ketorolac (Toradol) 30 mg 30 mg   lidocaine (Xylocaine) injection 2 % 80 mg   midazolam (Versed) 1 mg/1 mL 2 mg   ondansetron 2 mg/mL 4 mg   propofol (Diprivan) injection 10 mg/mL 200 mg              Anesthesia Record               Intraprocedure I/O Totals          Intake    LR bolus 250.00 mL    Total Intake 250 mL          Specimen:   ID Type Source Tests Collected by Time   1 : A. LEFT WRIST SOFT TISSUE MASS Tissue SOFT TISSUE MASS RESECTION SURGICAL PATHOLOGY EXAM Chaparro Florentino MD 2025 0804                  Findings: L dorsal wrist ganglion cyst excision    Complications:  None; patient tolerated the procedure well.     Disposition: PACU - hemodynamically stable.  Condition: stable  Specimens Collected:   ID Type Source Tests Collected by Time   1 : A. LEFT WRIST SOFT TISSUE MASS Tissue SOFT  TISSUE MASS RESECTION SURGICAL PATHOLOGY EXAM Chaparro Florentino MD 2/24/2025 0804     Attending Attestation:     Chaparro Florentino  Phone Number: [phone]

## 2025-03-03 LAB
LABORATORY COMMENT REPORT: NORMAL
PATH REPORT.FINAL DX SPEC: NORMAL
PATH REPORT.GROSS SPEC: NORMAL
PATH REPORT.RELEVANT HX SPEC: NORMAL
PATH REPORT.TOTAL CANCER: NORMAL
RESIDENT REVIEW: NORMAL

## 2025-03-12 ENCOUNTER — OFFICE VISIT (OUTPATIENT)
Dept: ORTHOPEDIC SURGERY | Facility: HOSPITAL | Age: 30
End: 2025-03-12
Payer: COMMERCIAL

## 2025-03-12 VITALS — BODY MASS INDEX: 35.08 KG/M2 | WEIGHT: 198 LBS | HEIGHT: 63 IN

## 2025-03-12 DIAGNOSIS — M67.432 GANGLION OF LEFT WRIST: Primary | ICD-10-CM

## 2025-03-12 PROCEDURE — 3008F BODY MASS INDEX DOCD: CPT | Performed by: STUDENT IN AN ORGANIZED HEALTH CARE EDUCATION/TRAINING PROGRAM

## 2025-03-12 PROCEDURE — 99211 OFF/OP EST MAY X REQ PHY/QHP: CPT | Performed by: STUDENT IN AN ORGANIZED HEALTH CARE EDUCATION/TRAINING PROGRAM

## 2025-03-12 ASSESSMENT — PAIN - FUNCTIONAL ASSESSMENT: PAIN_FUNCTIONAL_ASSESSMENT: NO/DENIES PAIN

## 2025-03-17 ENCOUNTER — APPOINTMENT (OUTPATIENT)
Facility: HOSPITAL | Age: 30
End: 2025-03-17
Payer: COMMERCIAL

## 2025-03-19 NOTE — PROGRESS NOTES
CHIEF COMPLAINT: First postop visit  DOI: None  DOS: 2/24/2025  Excision of left dorsal wrist soft tissue mass      HISTORY OF PRESENT ILLNESS    This is a very pleasant 29-year-old female who is presented for scheduled 2-week follow-up visit after above procedure.  She has been reportedly compliant with postop splint care and restrictions.  She reports to be doing well from a pain standpoint no pain at restpain with movement.  Denies any new numbness tingling or paresthesias.    PHYSICAL EXAM    Extremities / Musculoskeletal:                  Left upper extremity:  Postoperative dressing removed.  Monocryl tails cut.  Wound healthy in appearance.  No signs of any drainage or infection of any kind.  Complete resolution of the mass.  Full extension in all digits.  Full flexion of all digits.  Wrist slightly stiff approximately 6 degrees of extension and 60 degrees of flexion, full pronosupination. Motor intact to radian/PIN/median/AIN/Ulnar nerve distributions. Sensation intact to light touch in the median/ulnar/radial nerve distributions. 2+ radial pulse at the wrist, hand and all digits are warm and well-perfused with a brisk capillary refill of <2s.       IMAGING / LABS / EMGs:    No interval imaging obtained for today's visit although anatomic pathology reviewed with the patient:  Left dorsal wrist specimen from 2/24/2025   FINAL DIAGNOSIS   A. Soft tissue mass, left wrist, excision:   -- Consistent with ganglion cyst.        ASSESSMENT:   Status post excision of left dorsal wrist soft tissue mass on 2/24/2025, pathology consistent with simple ganglion cyst.    Patient is doing well clinically, the pathology is reassuring.  She is now okay to return to all desired activities.  I do not think we need occupational therapy at this time as simple return to daily activities should restore full wrist motion.    PLAN:   As tolerated    Follow-up in: 5 weeks, can consider OT at that time if she is not making the progress  we expect  XR at next visit: None    The diagnosis and treatment plan were reviewed with the patient. All questions were answered. The patient verbalized understanding of the treatment plan. There were no barriers to understanding identified.     Note dictated with [employer] software.  Completed without full type editing and sent to avoid delay.    Chaparro Florentino M.D.    Department of Orthopaedics  Hand/Upper Extremity  Phone: [phone]  Appt. Phone: [phone]\

## 2025-04-14 PROBLEM — Z12.39 BREAST CANCER SCREENING, HIGH RISK PATIENT: Status: ACTIVE | Noted: 2025-04-14

## 2025-04-16 ENCOUNTER — OFFICE VISIT (OUTPATIENT)
Dept: ORTHOPEDIC SURGERY | Facility: HOSPITAL | Age: 30
End: 2025-04-16
Payer: COMMERCIAL

## 2025-04-16 VITALS — HEIGHT: 63 IN | BODY MASS INDEX: 35.08 KG/M2 | WEIGHT: 198 LBS

## 2025-04-16 DIAGNOSIS — M67.432 GANGLION OF LEFT WRIST: Primary | ICD-10-CM

## 2025-04-16 PROCEDURE — 1036F TOBACCO NON-USER: CPT | Performed by: STUDENT IN AN ORGANIZED HEALTH CARE EDUCATION/TRAINING PROGRAM

## 2025-04-16 PROCEDURE — 3008F BODY MASS INDEX DOCD: CPT | Performed by: STUDENT IN AN ORGANIZED HEALTH CARE EDUCATION/TRAINING PROGRAM

## 2025-04-16 PROCEDURE — 99211 OFF/OP EST MAY X REQ PHY/QHP: CPT | Performed by: STUDENT IN AN ORGANIZED HEALTH CARE EDUCATION/TRAINING PROGRAM

## 2025-04-16 ASSESSMENT — PAIN DESCRIPTION - DESCRIPTORS: DESCRIPTORS: ACHING;SORE;TIGHTNESS

## 2025-04-16 ASSESSMENT — PAIN - FUNCTIONAL ASSESSMENT: PAIN_FUNCTIONAL_ASSESSMENT: 0-10

## 2025-04-16 ASSESSMENT — PAIN SCALES - GENERAL: PAINLEVEL_OUTOF10: 5 - MODERATE PAIN

## 2025-04-16 NOTE — PROGRESS NOTES
CHIEF COMPLAINT: First postop visit  DOI: None  DOS: 2/24/2025  Excision of left dorsal wrist soft tissue mass      HISTORY OF PRESENT ILLNESS    This is a very pleasant 29-year-old female who is presented for scheduled 2-week follow-up visit after above procedure.  She has been reportedly compliant with postop splint care and restrictions.  She reports to be doing well from a pain standpoint no pain at restpain with movement.  Denies any new numbness tingling or paresthesias.    Interval update 4/16/2025:  Patient return for scheduled follow-up visit.  She is reporting to be doing well.  No pain whatsoever at rest or with activity.  No numbness tingling paresthesias.  No subjective concern for mass recurrence.  She has been working well and regaining her motion without formal occupational therapy yet    PHYSICAL EXAM    Extremities / Musculoskeletal:                  Left upper extremity:  Well-healed wound.  No signs of infection.  Complete resolution/absence of the mass.  Full extension in all digits.  Full flexion of all digits.  Much improved active wrist range of motion with extension 80 degrees flexion 80 degrees.  9090 pronosupination.. Motor intact to radian/PIN/median/AIN/Ulnar nerve distributions. Sensation intact to light touch in the median/ulnar/radial nerve distributions. 2+ radial pulse at the wrist, hand and all digits are warm and well-perfused with a brisk capillary refill of <2s.       IMAGING / LABS / EMGs:    No interval imaging obtained for today's visit although anatomic pathology reviewed with the patient:  Left dorsal wrist specimen from 2/24/2025   FINAL DIAGNOSIS   A. Soft tissue mass, left wrist, excision:   -- Consistent with ganglion cyst.        ASSESSMENT:   Status post excision of left dorsal wrist soft tissue mass on 2/24/2025, pathology consistent with simple ganglion cyst.    Patient is doing well clinically, the pathology is reassuring.  She is now okay to return to all desired  activities.  I do not think we need occupational therapy at this time as simple return to daily activities should restore full wrist motion.    Interval update 4/16/2025:  Patient is doing excellent clinically.  I do not think we need formal occupational therapy.  I encouraged her to return to all desired activities without any restrictions.    Follow-up in: Although we do not need a scheduled follow-up visit at this time, I encouraged the patient to return to clinic if they have any concerns or issues whatsoever. The patient was provided with my office's contact information.     XR at next visit: None    The diagnosis and treatment plan were reviewed with the patient. All questions were answered. The patient verbalized understanding of the treatment plan. There were no barriers to understanding identified.     Note dictated with [employer] software.  Completed without full type editing and sent to avoid delay.    Chaparro Florentino M.D.    Department of Orthopaedics  Hand/Upper Extremity  Phone: [phone]  Appt. Phone: [phone]\

## 2025-04-21 ENCOUNTER — TELEPHONE (OUTPATIENT)
Dept: SURGICAL ONCOLOGY | Facility: CLINIC | Age: 30
End: 2025-04-21
Payer: COMMERCIAL

## 2025-04-21 NOTE — TELEPHONE ENCOUNTER
Spoke with Rocio.  Based on her female and family history her 5 year risk is only 0.2% versus the population risk of 0.1%.  Her lifetime risk is currently 17%.     I gave her information about the breast clinic. She could consider starting annual mammogram @ 31, since her paternal grandmother was diagnosed @ 38.    Radha Núñez  High risk breast navigator  [phone]

## (undated) DEVICE — SUTURE, VICRYL, 0, 27 IN, UR-6, VIOLET

## (undated) DEVICE — CUFF, TOURNIQUET, 18 X 4, DUAL PORT/SNGL BLADDER, DISP, LF

## (undated) DEVICE — BASIN SET, D & C

## (undated) DEVICE — TRAY, MINOR, SINGLE BASIN, STERILE

## (undated) DEVICE — GLOVE, SURGICAL, PROTEXIS PI , 7.5, PF, LF

## (undated) DEVICE — NEEDLE, HYPODERMIC, REGULAR WALL, REGULAR BEVEL, 18 G X 1.5 IN

## (undated) DEVICE — GLOVE, SURGICAL, PROTEXIS,  6.5, PF, LATEX

## (undated) DEVICE — REST, HEAD, BAGEL, 9 IN

## (undated) DEVICE — SUTURE, VICRYL, 4-0, 18 IN, UNDYED BR PS-2

## (undated) DEVICE — GLOVE, SURGICAL, PROTEXIS PI BLUE W/NEUTHERA, 7.5, PF, LF

## (undated) DEVICE — CLIPPER, SURGICAL BLADE ASSEMBLY, GENERAL PURPOSE, SINGLE USE

## (undated) DEVICE — TUBE SET, PNEUMOCLEAR, SMOKE EVACU, HIGH-FLOW

## (undated) DEVICE — MANIFOLD, 4 PORT NEPTUNE STANDARD

## (undated) DEVICE — HOLSTER, JET SAFETY

## (undated) DEVICE — DRESSING, ADHESIVE, ISLAND, TELFA, 4 X 10 IN

## (undated) DEVICE — COVER, CART, 45 X 27 X 48 IN, CLEAR

## (undated) DEVICE — GOWN, SURGICAL, SMARTGOWN, XLARGE, STERILE

## (undated) DEVICE — TOWEL, SURGICAL, NEURO, O/R, 16 X 26, BLUE, STERILE

## (undated) DEVICE — ADHESIVE, SKIN, LIQUIBAND EXCEED

## (undated) DEVICE — SYRINGE, 60 CC, LUER LOCK, MONOJECT, W/O CAP, LF

## (undated) DEVICE — APPLICATOR, CHLORAPREP, W/ORANGE TINT, 26ML

## (undated) DEVICE — SUTURE, VICRYL, 0, 3 X 27 IN, CT-1, VIOLET

## (undated) DEVICE — Device

## (undated) DEVICE — REST, HEAD, BAGEL, 7 IN

## (undated) DEVICE — CARE KIT, LAPAROSCOPIC, ADVANCED

## (undated) DEVICE — LIGASURE, SEALER/DIVIDER MARYLAND JAW, 5MM

## (undated) DEVICE — PUMP, STRYKERFLOW 2 & HANDPIECE W/10FT. IRRIGATION TUBING

## (undated) DEVICE — TUBE, SALEM SUMP, 16 FR X 48IN, ENFIT

## (undated) DEVICE — SYRINGE, 20 CC, LUER LOCK

## (undated) DEVICE — SUTURE, ETHILON, 4-0, BLK, MONO, PS-2 18

## (undated) DEVICE — DISPOSABLE BIPOLAR FORCEPS

## (undated) DEVICE — DRESSING, GAUZE, SPONGE, 12 PLY, 4 X 4 IN, PLASTIC POUCH, STRL 10PK

## (undated) DEVICE — PREP TRAY, SKIN, DRY, W/GLOVES